# Patient Record
Sex: FEMALE | Race: BLACK OR AFRICAN AMERICAN | NOT HISPANIC OR LATINO | Employment: FULL TIME | URBAN - METROPOLITAN AREA
[De-identification: names, ages, dates, MRNs, and addresses within clinical notes are randomized per-mention and may not be internally consistent; named-entity substitution may affect disease eponyms.]

---

## 2017-01-03 ENCOUNTER — DOCUMENTATION ONLY (OUTPATIENT)
Dept: FAMILY MEDICINE | Facility: CLINIC | Age: 47
End: 2017-01-03

## 2017-01-03 ENCOUNTER — OFFICE VISIT (OUTPATIENT)
Dept: FAMILY MEDICINE | Facility: CLINIC | Age: 47
End: 2017-01-03
Payer: COMMERCIAL

## 2017-01-03 ENCOUNTER — HOSPITAL ENCOUNTER (OUTPATIENT)
Dept: RADIOLOGY | Facility: CLINIC | Age: 47
Discharge: HOME | End: 2017-01-03
Attending: FAMILY MEDICINE
Payer: COMMERCIAL

## 2017-01-03 VITALS
HEART RATE: 92 BPM | BODY MASS INDEX: 36.23 KG/M2 | OXYGEN SATURATION: 99 % | WEIGHT: 196.88 LBS | HEIGHT: 62 IN | TEMPERATURE: 98 F | SYSTOLIC BLOOD PRESSURE: 108 MMHG | DIASTOLIC BLOOD PRESSURE: 68 MMHG

## 2017-01-03 DIAGNOSIS — J30.1 SEASONAL ALLERGIC RHINITIS DUE TO POLLEN: ICD-10-CM

## 2017-01-03 DIAGNOSIS — Z13.220 SCREENING CHOLESTEROL LEVEL: ICD-10-CM

## 2017-01-03 DIAGNOSIS — Z83.3 FAMILY HISTORY OF DIABETES MELLITUS IN FATHER: ICD-10-CM

## 2017-01-03 DIAGNOSIS — E66.9 OBESITY (BMI 35.0-39.9 WITHOUT COMORBIDITY): ICD-10-CM

## 2017-01-03 DIAGNOSIS — Z12.39 SCREENING FOR BREAST CANCER: ICD-10-CM

## 2017-01-03 DIAGNOSIS — R07.9 CHEST PAIN, UNSPECIFIED TYPE: Primary | ICD-10-CM

## 2017-01-03 DIAGNOSIS — R61 NIGHT SWEATS: ICD-10-CM

## 2017-01-03 DIAGNOSIS — L73.1 INGROWN HAIR: ICD-10-CM

## 2017-01-03 LAB

## 2017-01-03 PROCEDURE — 77067 SCR MAMMO BI INCL CAD: CPT | Mod: TC

## 2017-01-03 PROCEDURE — 1159F MED LIST DOCD IN RCRD: CPT | Mod: S$GLB,,, | Performed by: PHYSICIAN ASSISTANT

## 2017-01-03 PROCEDURE — 99999 PR PBB SHADOW E&M-NEW PATIENT-LVL III: CPT | Mod: PBBFAC,,, | Performed by: PHYSICIAN ASSISTANT

## 2017-01-03 PROCEDURE — 93005 ELECTROCARDIOGRAM TRACING: CPT | Mod: S$GLB,,, | Performed by: FAMILY MEDICINE

## 2017-01-03 PROCEDURE — 77067 SCR MAMMO BI INCL CAD: CPT | Mod: 26,,, | Performed by: RADIOLOGY

## 2017-01-03 PROCEDURE — 99204 OFFICE O/P NEW MOD 45 MIN: CPT | Mod: S$GLB,,, | Performed by: PHYSICIAN ASSISTANT

## 2017-01-03 PROCEDURE — 81003 URINALYSIS AUTO W/O SCOPE: CPT

## 2017-01-03 PROCEDURE — 93010 ELECTROCARDIOGRAM REPORT: CPT | Mod: S$GLB,,, | Performed by: INTERNAL MEDICINE

## 2017-01-03 RX ORDER — SULFAMETHOXAZOLE AND TRIMETHOPRIM 800; 160 MG/1; MG/1
1 TABLET ORAL 2 TIMES DAILY
Qty: 20 TABLET | Refills: 0 | Status: SHIPPED | OUTPATIENT
Start: 2017-01-03 | End: 2017-01-13

## 2017-01-03 RX ORDER — MUPIROCIN 20 MG/G
OINTMENT TOPICAL 3 TIMES DAILY
Qty: 22 G | Refills: 0 | Status: SHIPPED | OUTPATIENT
Start: 2017-01-03 | End: 2017-01-13

## 2017-01-03 RX ORDER — FLUTICASONE PROPIONATE 50 MCG
2 SPRAY, SUSPENSION (ML) NASAL DAILY
Qty: 16 G | Refills: 2 | Status: SHIPPED | OUTPATIENT
Start: 2017-01-03

## 2017-01-03 NOTE — MR AVS SNAPSHOT
[facility]  [address]  [place] [address]  Phone: [phone]  Fax: [phone]                  Megan Brooks   1/3/2017 9:20 AM   Office Visit    Description:  Female : 1970   Provider:  PAUL Sparks   Department:  [place] [facility]           Reason for Visit     hot flashes, eye problem, pain           Diagnoses this Visit        Comments    Chest pain, unspecified type    -  Primary     Night sweats         Seasonal allergic rhinitis due to pollen         Family history of diabetes mellitus in father         Screening for breast cancer         Screening cholesterol level         Ingrown hair         Obesity (BMI 35.0-39.9 without comorbidity)                To Do List           Future Appointments        Provider Department Dept Phone    1/3/2017 10:15 AM [facility] MAMMO1 [facility]- Mammography [phone]    1/3/2017 11:45 AM LAB, [place] [facility] - Lab [phone]      Goals (5 Years of Data)     None      Follow-Up and Disposition     Return for labs today; needs apt to establish care.    Follow-up and Disposition History       These Medications        Disp Refills Start End    fluticasone (FLONASE) 50 mcg/actuation nasal spray 16 g 2 1/3/2017     2 sprays by Each Nare route once daily. - Each Nare    Pharmacy: [facility] [address] Ph #: [phone]       sulfamethoxazole-trimethoprim 800-160mg (BACTRIM DS) 800-160 mg Tab 20 tablet 0 1/3/2017 2017    Take 1 tablet by mouth 2 (two) times daily. - Oral    Pharmacy: [facility] [address] [facility] Ph #: [phone]       mupirocin (BACTROBAN) 2 % ointment 22 g 0 1/3/2017 2017    Apply topically 3 (three) times daily. - Topical (Top)    Pharmacy: [facility] [address] [facility] Ph #: [phone]        "  [facility]     [facility] -  Assistance  Registered nurses in the [facility] provide clinical advisement, health education, appointment booking, and other advisory services.  Call for this free service at [phone].             Medications           Message regarding Medications     Verify the changes and/or additions to your medication regime listed below are the same as discussed with your clinician today.  If any of these changes or additions are incorrect, please notify your healthcare provider.        START taking these NEW medications        Refills    fluticasone (FLONASE) 50 mcg/actuation nasal spray 2    Si sprays by Each Nare route once daily.    Class: Normal    Route: Each Nare    sulfamethoxazole-trimethoprim 800-160mg (BACTRIM DS) 800-160 mg Tab 0    Sig: Take 1 tablet by mouth 2 (two) times daily.    Class: Normal    Route: Oral    mupirocin (BACTROBAN) 2 % ointment 0    Sig: Apply topically 3 (three) times daily.    Class: Normal    Route: Topical (Top)           Verify that the below list of medications is an accurate representation of the medications you are currently taking.  If none reported, the list may be blank. If incorrect, please contact your healthcare provider. Carry this list with you in case of emergency.           Current Medications     fluticasone (FLONASE) 50 mcg/actuation nasal spray 2 sprays by Each Nare route once daily.    mupirocin (BACTROBAN) 2 % ointment Apply topically 3 (three) times daily.    sulfamethoxazole-trimethoprim 800-160mg (BACTRIM DS) 800-160 mg Tab Take 1 tablet by mouth 2 (two) times daily.           Clinical Reference Information           Vital Signs - Last Recorded  Most recent update: 1/3/2017  9:12 AM by Pily Benites    BP Pulse Temp Ht Wt SpO2    108/68 (BP Location: Right arm, Patient Position: Sitting, BP Method: Automatic) 92 98.2 °F (36.8 °C) (Oral) 5' 2" (1.575 m) 89.3 kg (196 lb 13.9 oz) 99%    BMI "                36.01 kg/m2          Blood Pressure          Most Recent Value    BP  108/68      Allergies as of 1/3/2017     No Known Allergies      Immunizations Administered on Date of Encounter - 1/3/2017     None      Orders Placed During Today's Visit      Normal Orders This Visit    IN OFFICE EKG 12-LEAD (to Dawn)     Urinalysis     Future Labs/Procedures Expected by Expires    CBC auto differential  1/3/2017 3/4/2018    Comprehensive metabolic panel  1/3/2017 3/4/2018    Hemoglobin A1c  1/3/2017 3/4/2018    Lipid panel  1/3/2017 3/4/2018    Mammo Digital Screening Bilat with CAD  1/3/2017 3/3/2018    TSH  1/3/2017 3/4/2018

## 2017-01-03 NOTE — PROGRESS NOTES
Pre-Visit Chart Review  For Appointment Scheduled on 01/03/17    Health Maintenance Due   Topic Date Due    Lipid Panel  1970    TETANUS VACCINE  12/19/1989    Pap Smear  12/19/1992    Mammogram  12/19/2011    Influenza Vaccine  08/01/2016

## 2017-01-05 DIAGNOSIS — R74.8 ALKALINE PHOSPHATASE ELEVATION: Primary | ICD-10-CM

## 2017-01-05 DIAGNOSIS — D64.9 ANEMIA, UNSPECIFIED TYPE: ICD-10-CM

## 2017-07-07 ENCOUNTER — HOSPITAL ENCOUNTER (EMERGENCY)
Facility: HOSPITAL | Age: 47
Discharge: HOME | End: 2017-07-07
Attending: EMERGENCY MEDICINE

## 2017-07-07 VITALS
SYSTOLIC BLOOD PRESSURE: 136 MMHG | DIASTOLIC BLOOD PRESSURE: 89 MMHG | RESPIRATION RATE: 20 BRPM | TEMPERATURE: 99 F | BODY MASS INDEX: 34.04 KG/M2 | HEIGHT: 62 IN | WEIGHT: 185 LBS | HEART RATE: 92 BPM | OXYGEN SATURATION: 100 %

## 2017-07-07 DIAGNOSIS — M25.50 ARTHRALGIA, UNSPECIFIED JOINT: Primary | ICD-10-CM

## 2017-07-07 PROCEDURE — 99283 EMERGENCY DEPT VISIT LOW MDM: CPT

## 2017-07-08 NOTE — ED NOTES
Pt presents with complaints of pain in joints in hands, feet and arms. Pt says she has had this for several months now and worse in the morning time. Pt not being treated at this time. No noted edema. Pt alert and oriented with neuro intact.

## 2017-07-08 NOTE — ED PROVIDER NOTES
Encounter Date: 7/7/2017       History     Chief Complaint   Patient presents with    Joint Pain     swelling hands / feet  / knees      Patient is a 46 year old female with complaint of joint pain for months. She denied PMH. She states she has pain and swelling to bilateral hands and feet. She states it is intermittent and worsened over the last week. She denied injury. She states she has been taking over the counter medications with no improvements. She denied redness of warmth to the area.       The history is provided by the patient.     Review of patient's allergies indicates:  No Known Allergies  History reviewed. No pertinent past medical history.  Past Surgical History:   Procedure Laterality Date    HYSTERECTOMY       Family History   Problem Relation Age of Onset    Hypertension Mother     Stroke Mother     Diabetes Mother     Coronary artery disease Mother     Heart attack Mother     Hypertension Father     Hyperlipidemia Father     Cancer Father      lung cancer     Social History   Substance Use Topics    Smoking status: Never Smoker    Smokeless tobacco: Never Used    Alcohol use Yes      Comment: holidays     Review of Systems   Constitutional: Negative for chills and fever.   HENT: Negative for congestion and sore throat.    Respiratory: Negative for cough and shortness of breath.    Cardiovascular: Negative for chest pain.   Gastrointestinal: Negative for abdominal pain, diarrhea, nausea and vomiting.   Genitourinary: Negative for dysuria.   Musculoskeletal: Positive for arthralgias and joint swelling. Negative for back pain.   Skin: Negative for rash.   Neurological: Negative for weakness.   Hematological: Does not bruise/bleed easily.       Physical Exam     Initial Vitals [07/07/17 1811]   BP Pulse Resp Temp SpO2   136/89 92 20 98.6 °F (37 °C) 100 %      MAP       104.67         Physical Exam    Nursing note and vitals reviewed.  Constitutional: She appears well-developed and  well-nourished. No distress.   HENT:   Head: Normocephalic and atraumatic.   Right Ear: External ear normal.   Left Ear: External ear normal.   Nose: Nose normal.   Eyes: Conjunctivae are normal. Pupils are equal, round, and reactive to light. Right eye exhibits no discharge. Left eye exhibits no discharge.   Neck: Normal range of motion. Neck supple.   Cardiovascular: Normal rate, regular rhythm and normal heart sounds. Exam reveals no gallop and no friction rub.    No murmur heard.  Pulmonary/Chest: Breath sounds normal. She has no wheezes. She has no rhonchi. She has no rales.   Abdominal: Soft. Bowel sounds are normal. There is no tenderness. There is no guarding.   Musculoskeletal: Normal range of motion.   Tenderness to bilateral hands and feet. There is no tenderness to ankle or wrists. There is no erythema or warmth. She has full ROM. Sensation intact. Cap refill < 3 seconds.    Neurological: She is alert.   Skin: Skin is warm and dry.         ED Course   Procedures  Labs Reviewed - No data to display          Medical Decision Making:   History:   Old Medical Records: I decided to obtain old medical records.       APC / Resident Notes:   This is an emergent evaluation of a 46 year old female with complaint of joint pain to bilateral hands and feet. She denied erythema or warmth. She is neurovascularly intact. She has full ROM. No sign of septic joint. symptoms consistent with arthritis. She has been instructed to continue NSAID use and follow up with rheumatology. Discussed results with patient. Return precautions given. Patient is to follow up with their primary care provider. Case was discussed with Dr. Kline who has evaluated the patient and is in agreement with the plan of care. All questions answered.                 ED Course     Clinical Impression:   The encounter diagnosis was Arthralgia, unspecified joint.                           Olena Leija PA-C  07/07/17 [id number]

## 2020-11-27 ENCOUNTER — OFFICE VISIT (OUTPATIENT)
Dept: RHEUMATOLOGY | Facility: CLINIC | Age: 50
End: 2020-11-27
Payer: MEDICARE

## 2020-11-27 VITALS
HEART RATE: 109 BPM | SYSTOLIC BLOOD PRESSURE: 120 MMHG | BODY MASS INDEX: 35.78 KG/M2 | WEIGHT: 194.44 LBS | TEMPERATURE: 98 F | DIASTOLIC BLOOD PRESSURE: 62 MMHG | HEIGHT: 62 IN

## 2020-11-27 DIAGNOSIS — M35.1 MCTD (MIXED CONNECTIVE TISSUE DISEASE): Primary | ICD-10-CM

## 2020-11-27 DIAGNOSIS — M19.90 OSTEOARTHRITIS, UNSPECIFIED OSTEOARTHRITIS TYPE, UNSPECIFIED SITE: ICD-10-CM

## 2020-11-27 DIAGNOSIS — Z79.899 ENCOUNTER FOR LONG-TERM (CURRENT) USE OF OTHER MEDICATIONS: ICD-10-CM

## 2020-11-27 DIAGNOSIS — M34.9 SCLERODERMA, DIFFUSE: ICD-10-CM

## 2020-11-27 DIAGNOSIS — M89.9 DISORDER OF BONE, UNSPECIFIED: ICD-10-CM

## 2020-11-27 DIAGNOSIS — M35.00 SJOGREN'S SYNDROME, WITH UNSPECIFIED ORGAN INVOLVEMENT: ICD-10-CM

## 2020-11-27 DIAGNOSIS — J84.9 ILD (INTERSTITIAL LUNG DISEASE): ICD-10-CM

## 2020-11-27 DIAGNOSIS — Z71.89 COUNSELING AND COORDINATION OF CARE: ICD-10-CM

## 2020-11-27 PROCEDURE — 99204 OFFICE O/P NEW MOD 45 MIN: CPT | Mod: S$PBB,,, | Performed by: INTERNAL MEDICINE

## 2020-11-27 PROCEDURE — 99999 PR PBB SHADOW E&M-NEW PATIENT-LVL IV: CPT | Mod: PBBFAC,,, | Performed by: INTERNAL MEDICINE

## 2020-11-27 PROCEDURE — 99204 OFFICE O/P NEW MOD 45 MIN: CPT | Mod: PBBFAC,PN | Performed by: INTERNAL MEDICINE

## 2020-11-27 PROCEDURE — 99999 PR PBB SHADOW E&M-NEW PATIENT-LVL IV: ICD-10-PCS | Mod: PBBFAC,,, | Performed by: INTERNAL MEDICINE

## 2020-11-27 PROCEDURE — 99204 PR OFFICE/OUTPT VISIT, NEW, LEVL IV, 45-59 MIN: ICD-10-PCS | Mod: S$PBB,,, | Performed by: INTERNAL MEDICINE

## 2020-11-27 RX ORDER — NYSTATIN 100000 U/G
OINTMENT TOPICAL
COMMUNITY
Start: 2020-11-10

## 2020-11-27 RX ORDER — GABAPENTIN 300 MG/1
600 CAPSULE ORAL 2 TIMES DAILY
Qty: 120 CAPSULE | Refills: 3 | Status: SHIPPED | OUTPATIENT
Start: 2020-11-27 | End: 2021-03-01 | Stop reason: SDUPTHER

## 2020-11-27 RX ORDER — CYCLOSPORINE 0.5 MG/ML
EMULSION OPHTHALMIC
COMMUNITY
Start: 2020-09-03

## 2020-11-27 RX ORDER — FUROSEMIDE 20 MG/1
TABLET ORAL
COMMUNITY
Start: 2020-10-31

## 2020-11-27 RX ORDER — SILDENAFIL CITRATE 20 MG/1
TABLET ORAL
COMMUNITY
Start: 2020-09-11

## 2020-11-27 RX ORDER — MIRTAZAPINE 30 MG/1
TABLET, FILM COATED ORAL
COMMUNITY
Start: 2020-09-04

## 2020-11-27 RX ORDER — HYDROXYCHLOROQUINE SULFATE 200 MG/1
TABLET, FILM COATED ORAL
COMMUNITY
Start: 2020-10-12 | End: 2023-10-11

## 2020-11-27 RX ORDER — ERYTHROMYCIN BASE 250 MG
CAPSULE,DELAYED RELEASE (ENTERIC COATED) ORAL
COMMUNITY
Start: 2020-11-20

## 2020-11-27 RX ORDER — PANTOPRAZOLE SODIUM 40 MG/1
TABLET, DELAYED RELEASE ORAL
COMMUNITY
Start: 2020-10-02

## 2020-11-27 RX ORDER — SUCRALFATE 1 G/10ML
SUSPENSION ORAL
COMMUNITY
Start: 2020-10-21

## 2020-11-27 RX ORDER — POTASSIUM CHLORIDE 750 MG/1
TABLET, EXTENDED RELEASE ORAL
COMMUNITY
Start: 2020-10-19

## 2020-11-27 RX ORDER — CELECOXIB 100 MG/1
100 CAPSULE ORAL 2 TIMES DAILY
Qty: 60 CAPSULE | Refills: 3 | Status: SHIPPED | OUTPATIENT
Start: 2020-11-27 | End: 2021-03-01 | Stop reason: SDUPTHER

## 2020-11-27 RX ORDER — MYCOPHENOLATE MOFETIL 500 MG/1
TABLET ORAL
COMMUNITY
Start: 2020-09-03

## 2020-11-27 RX ORDER — NIFEDIPINE 60 MG/1
TABLET, EXTENDED RELEASE ORAL
COMMUNITY
Start: 2020-11-25

## 2020-11-27 RX ORDER — OMEPRAZOLE 40 MG/1
CAPSULE, DELAYED RELEASE ORAL
COMMUNITY
Start: 2020-11-07

## 2020-11-27 RX ORDER — ESTRADIOL 0.1 MG/G
CREAM VAGINAL
COMMUNITY
Start: 2020-11-09

## 2020-11-27 RX ORDER — GABAPENTIN 300 MG/1
CAPSULE ORAL
COMMUNITY
Start: 2020-10-12 | End: 2020-11-27

## 2020-11-27 NOTE — PROGRESS NOTES
RHEUMATOLOGY OUTPATIENT CLINIC NOTE    11/27/2020    Attending Rheumatologist: Herrera Gonsales  Primary Care Provider: Kayley Morales MD (Inactive)   Physician Requesting Consultation: Kayley Morales MD  [address] [place] [address]  Chief Complaint/Reason For Consultation:  Pain      Subjective:       HPI  Megan Brooks is a 49 y.o. Black or  female with medical history noted below who presents to establish care for MCTD.     Patient use to follow with Dr. Fitch. Last seen in 8/2019.  +RF, ASHIA 1:620, +SSA, +SMRNP, +Centromere, +Chromatin, +RNP   Seems as her Raynaud's started in 2017, gets diagnosed with SSc and started on MMF 10/2017.   Started RTX 10/2018, last infusion 9/2020.   Follows with Dr. Hester at [facility].     Patient reports since Dr. Fitch left [facility], she has not seen a Rheumatologist. She reports following closely at [facility], where she get most of her Care: Pulm, GI, EYE. They are managing her immunosuppressive therapies.     She reports now that for the last couple of months she has been dealing with joint pain in her left shoulder, left elbow, left hip and knee. Had Hip Xray showing DJD. She notes the pain is all the time but it is worse at night as she tries to sleep on that side and cannot fall asleep. She reports minimal morning stiffness. And difficulties with daily tasks. Tried gabapentin for relief with no benefit. She endorses her breathing is stable as long as she uses the infusion. She notes her hands develops ulcers and become tender at times. Endorses alopecia, oral sores, sicca. Reports recent rash under her left breast which he PCP treated as fungal. It resolved. Her skin tightening has not progressed. Follows with GI for GERD.   No Photosensitivity, Pleuritis/serositis, Easy Bruising, LAD, Miscarriages/Blood clots.      Review of Systems   Constitutional: Positive for fatigue. Negative for appetite change, chills, fever and unexpected weight change.    HENT: Positive for mouth sores. Negative for nasal congestion, ear discharge, ear pain, hearing loss, nosebleeds, sneezing, sore throat, tinnitus and trouble swallowing.    Eyes: Negative for photophobia, pain, discharge, redness, itching and visual disturbance.   Respiratory: Positive for cough and shortness of breath. Negative for chest tightness and wheezing.    Cardiovascular: Negative for chest pain, palpitations and leg swelling.   Gastrointestinal: Positive for reflux. Negative for abdominal distention, abdominal pain, blood in stool, constipation, diarrhea, nausea and vomiting.   Endocrine: Negative for cold intolerance, heat intolerance, polydipsia, polyphagia and polyuria.   Genitourinary: Negative for difficulty urinating, dyspareunia, dysuria, flank pain, frequency, genital sores, hematuria, menstrual problem, pelvic pain, urgency, vaginal bleeding, vaginal discharge, vaginal pain and vaginal dryness.   Musculoskeletal: Positive for arthralgias, back pain, leg pain and myalgias. Negative for gait problem, joint swelling, neck pain, neck stiffness and joint deformity.   Integumentary:  Negative for pallor and rash.   Neurological: Negative for dizziness, seizures, weakness, light-headedness, numbness and headaches.   Hematological: Negative for adenopathy. Bruises/bleeds easily.   Psychiatric/Behavioral: Positive for sleep disturbance. Negative for confusion and decreased concentration. The patient is not nervous/anxious.    All other systems reviewed and are negative.       Chronic comorbid conditions affecting medical decision making today:  No past medical history on file.  Past Surgical History:   Procedure Laterality Date    HYSTERECTOMY       Family History   Problem Relation Age of Onset    Hypertension Mother     Stroke Mother     Diabetes Mother     Coronary artery disease Mother     Heart attack Mother     Hypertension Father     Hyperlipidemia Father     Cancer Father         lung  cancer     Social History     Substance and Sexual Activity   Alcohol Use Yes    Comment: holidays     Social History     Tobacco Use   Smoking Status Never Smoker   Smokeless Tobacco Never Used     Social History     Substance and Sexual Activity   Drug Use Not on file       Current Outpatient Medications:     erythromycin 250 mg EC capsule, , Disp: , Rfl:     estradioL (ESTRACE) 0.01 % (0.1 mg/gram) vaginal cream, , Disp: , Rfl:     fluticasone (FLONASE) 50 mcg/actuation nasal spray, 2 sprays by Each Nare route once daily., Disp: 16 g, Rfl: 2    furosemide (LASIX) 20 MG tablet, , Disp: , Rfl:     hydrOXYchloroQUINE (PLAQUENIL) 200 mg tablet, , Disp: , Rfl:     mirtazapine (REMERON) 30 MG tablet, , Disp: , Rfl:     mycophenolate (CELLCEPT) 500 mg Tab, , Disp: , Rfl:     NIFEdipine (ADALAT CC) 60 MG TbSR, , Disp: , Rfl:     nystatin (MYCOSTATIN) ointment, , Disp: , Rfl:     omeprazole (PRILOSEC) 40 MG capsule, , Disp: , Rfl:     pantoprazole (PROTONIX) 40 MG tablet, , Disp: , Rfl:     potassium chloride (KLOR-CON) 10 MEQ TbSR, , Disp: , Rfl:     RESTASIS 0.05 % ophthalmic emulsion, , Disp: , Rfl:     sildenafil (REVATIO) 20 mg Tab, , Disp: , Rfl:     sucralfate (CARAFATE) 100 mg/mL suspension, , Disp: , Rfl:     celecoxib (CELEBREX) 100 MG capsule, Take 1 capsule (100 mg total) by mouth 2 (two) times daily., Disp: 60 capsule, Rfl: 3    gabapentin (NEURONTIN) 300 MG capsule, Take 2 capsules (600 mg total) by mouth 2 (two) times daily., Disp: 120 capsule, Rfl: 3     Objective:         Vitals:    11/27/20 0825   BP: 120/62   Pulse: 109   Temp: 98.3 °F (36.8 °C)     Physical Exam   Constitutional: She is oriented to person, place, and time and well-developed, well-nourished, and in no distress.   HENT:   Head: Normocephalic and atraumatic.   Right Ear: External ear normal.   Left Ear: External ear normal.   Nose: Nose normal.   Mouth/Throat: Oropharynx is clear and moist.   Eyes: Conjunctivae and EOM  are normal. Pupils are equal, round, and reactive to light.   Neck: Normal range of motion. Neck supple.   Cardiovascular: Normal rate, regular rhythm and intact distal pulses.    Pulmonary/Chest: Effort normal and breath sounds normal.   Abdominal: Soft. Bowel sounds are normal.       Right Side Rheumatological Exam     Examination finds the shoulder, elbow, wrist, knee, 1st PIP, 1st MCP, 2nd PIP, 2nd MCP, 3rd PIP, 3rd MCP, 4th PIP, 4th MCP, 5th PIP and 5th MCP normal.    Left Side Rheumatological Exam     Examination finds the shoulder, elbow, wrist, knee, 1st PIP, 1st MCP, 2nd PIP, 2nd MCP, 3rd PIP, 3rd MCP, 4th PIP, 4th MCP, 5th PIP and 5th MCP normal.    Shoulder Exam   Tenderness Location: subacromion and biceps tendon    Crepitus: positive    Elbow/Wrist Exam   Tenderness Location: lateral epicondyle and medial epicondyle      Neurological: She is alert and oriented to person, place, and time.   Skin: No rash noted. No erythema.     Puffy fingers, osteolysis of left 2nd digit, no open ulcers, dry skin on her back    Psychiatric: Mood and affect normal.       Reviewed old and all outside pertinent medical records available.    All lab results personally reviewed and interpreted by me.  Lab Results   Component Value Date    WBC 6.41 01/03/2017    HGB 11.4 (L) 01/03/2017    HCT 36.2 (L) 01/03/2017    MCV 78 (L) 01/03/2017    MCH 24.4 (L) 01/03/2017    MCHC 31.5 (L) 01/03/2017    RDW 16.0 (H) 01/03/2017     01/03/2017    MPV 10.2 01/03/2017       Lab Results   Component Value Date     01/03/2017    K 3.9 01/03/2017     01/03/2017    CO2 25 01/03/2017    GLU 84 01/03/2017    BUN 9 01/03/2017    CALCIUM 9.4 01/03/2017    PROT 8.7 (H) 01/03/2017    ALBUMIN 3.4 (L) 01/03/2017    BILITOT 0.3 01/03/2017    AST 34 01/03/2017    ALKPHOS 177 (H) 01/03/2017    ALT 29 01/03/2017       Lab Results   Component Value Date    COLORU Yellow 01/03/2017    APPEARANCEUA Hazy (A) 01/03/2017    SPECGRAV 1.015  01/03/2017    PHUR 5.0 01/03/2017    PROTEINUA Negative 01/03/2017    KETONESU Negative 01/03/2017    LEUKOCYTESUR Negative 01/03/2017    NITRITE Negative 01/03/2017    UROBILINOGEN Negative 01/03/2017       No results found for: CRP    No results found for: SEDRATE, ERYTHROCYTES    No results found for: ASHIA, RF, SEDRATE    No components found for: 25OHVITDTOT, 21EFRZMH4, 51FJOUBZ8, METHODNOTE    No results found for: URICACID    No components found for: TSPOTTB      Imaging:  All imaging reviewed and independently interpreted by me.         ASSESSMENT / PLAN:     Megan Brooks is a 49 y.o. Black or  female with:      1. Mixed Connective Tissue Disease  - Patient with features of Sjogren's and Scleroderma, she has sicca, sclerodactyly, ILD, GERD, Skin tightening, oral sores, alopecia, Raynaud's   - she is currently under the care of both Pulmonary and GI at [facility]   - she is under immunosuppressive therapy: RTX, MMF, HCQ  - I agree with current management and will leave the choice of IS therapy to them   - I will update blood work here today  - CBC Auto Differential; Future  - Comprehensive Metabolic Panel; Future  - C4 Complement; Future  - C3 Complement; Future  - Protein/Creatinine Ratio, Urine; Future  - C-Reactive Protein; Future  - TSH; Future  - Vitamin D; Future  - Sedimentation rate; Future  - Anti-DNA Ab, Double-Stranded; Standing    2. Osteoarthritis, unspecified osteoarthritis type, unspecified site  - I believe the source of her pain to be mechanical and not inflammatory in nature   - I have discussed disease diagnosis and management   - will add NSAIDs and increased Gabapentin   - celecoxib (CELEBREX) 100 MG capsule; Take 1 capsule (100 mg total) by mouth 2 (two) times daily.  Dispense: 60 capsule; Refill: 3  - gabapentin (NEURONTIN) 300 MG capsule; Take 2 capsules (600 mg total) by mouth 2 (two) times daily.  Dispense: 120 capsule; Refill: 3  - Ambulatory referral/consult to  Physical/Occupational Therapy; Future  - Reassurance and exercise     3. Chronic NSAID use  - no history of GI bleed or coronary artery disease.  - previous labs without features of CKD.  - clinical significance side effect of long-term NSAID use discussed in detail.  - recommended for alternative therapies for pain management.    4. DMARD Toxicity Screening  - no live vaccines  - yearly EYE exams  - labs q3 months     5. Other specified counseling  - over 10 minutes spent regarding below topics:  - Immunization counseling done.  - Weight loss counseling done.  - Nutrition and exercise counseling.  - Limitation of alcohol consumption.  - Regular exercise:  Aerobic and resistance.  - Medication counseling provided.    6. Obesity  - would benefit from decreasing at least 10% of body weight.  - recommended goal of losing 1 lb per week.  - consider nutritionist evaluation.  - would consider screening for MAIKEL per PMD.    Follow up in about 3 months (around 2/27/2021).    Method of contact with patient concerns: [facility]    Disclaimer:  This note is prepared using voice recognition software and as such is likely to have errors and has not been proof read. Please contact me for questions.     Time spent: 45 minutes in face to face discussion concerning diagnosis, prognosis, review of lab and test results, benefits of treatment as well as management of disease, counseling of patient and coordination of care between various health care providers.  Greater than half the time spent was used for coordination of care and counseling of patient.    Herrera Gonsales M.D.  Rheumatology Department   [facility]

## 2020-11-30 ENCOUNTER — PATIENT MESSAGE (OUTPATIENT)
Dept: RHEUMATOLOGY | Facility: CLINIC | Age: 50
End: 2020-11-30

## 2020-11-30 ENCOUNTER — TELEPHONE (OUTPATIENT)
Dept: RHEUMATOLOGY | Facility: CLINIC | Age: 50
End: 2020-11-30

## 2020-11-30 DIAGNOSIS — E55.9 VITAMIN D DEFICIENCY: Primary | ICD-10-CM

## 2020-11-30 RX ORDER — ERGOCALCIFEROL 1.25 MG/1
50000 CAPSULE ORAL
Qty: 12 CAPSULE | Refills: 0 | Status: SHIPPED | OUTPATIENT
Start: 2020-11-30

## 2020-12-21 ENCOUNTER — CLINICAL SUPPORT (OUTPATIENT)
Dept: REHABILITATION | Facility: HOSPITAL | Age: 50
End: 2020-12-21
Attending: INTERNAL MEDICINE
Payer: MEDICARE

## 2020-12-21 DIAGNOSIS — M25.512 CHRONIC LEFT SHOULDER PAIN: ICD-10-CM

## 2020-12-21 DIAGNOSIS — M19.90 OSTEOARTHRITIS, UNSPECIFIED OSTEOARTHRITIS TYPE, UNSPECIFIED SITE: ICD-10-CM

## 2020-12-21 DIAGNOSIS — R29.3 POSTURE IMBALANCE: ICD-10-CM

## 2020-12-21 DIAGNOSIS — G89.29 CHRONIC LEFT SHOULDER PAIN: ICD-10-CM

## 2020-12-21 PROCEDURE — 97110 THERAPEUTIC EXERCISES: CPT | Mod: PN

## 2020-12-21 PROCEDURE — 97161 PT EVAL LOW COMPLEX 20 MIN: CPT | Mod: PN

## 2020-12-21 NOTE — PLAN OF CARE
[facility]  Physical Therapy Initial Evaluation    Name: Megan Brooks  Clinic Number: [id number]    Therapy Diagnosis:   Encounter Diagnoses   Name Primary?    Osteoarthritis, unspecified osteoarthritis type, unspecified site     Posture imbalance     Chronic left shoulder pain      Physician: Herrera Gonsales MD    Physician Orders: PT Eval and Treat   Medical Diagnosis from Referral:   M19.90 (ICD-10-CM) - Osteoarthritis, unspecified osteoarthritis type, unspecified site    Evaluation Date: 12/21/2020  Plan of Care Expiration: 3/21/21    Authorization Period Expiration: 11/27/21  Visit # / Visits authorized: 1/1      Time In: 1215  Time Out: 1300    Total Billable Time: 45 minutes    Precautions: Standard    Subjective   Date of onset: one year    History of current condition:   Megan  is a 50 year old right handed female presenting with c/o whole body and joint pain. She c/o mostly of left sided pain and general weakness. Her primary concern is her right shoulder.  She has a diagnosis of RA.  She reports an insidious onset of let  a year ago. She states she may have OA in the hips. The patient denies a history of falls or use of an assistive device. She denies a balance problems.  Her goal is to decrease left upper and lower extremity pain and weakness and return to normal.      Medical History:   No past medical history on file.    Surgical History:   Megan Brooks  has a past surgical history that includes Hysterectomy.    Medications:   Megan has a current medication list which includes the following prescription(s): celecoxib, ergocalciferol, erythromycin, estradiol, fluticasone propionate, furosemide, gabapentin, hydroxychloroquine, mirtazapine, mycophenolate, nifedipine, nystatin, omeprazole, pantoprazole, potassium chloride, restasis, sildenafil, and sucralfate.    Allergies:   Review of patient's allergies indicates:  No Known Allergies     Imaging:  Not on record.      Prior Therapy: none  Social History:  Lives with [facility], [address]  Occupation: not working  Prior Level of Function: independent  Current Level of Function: independent    Pain:  Current 3/10, worst 10/10, best 3/10   Location: left shoulder, left arm/leg     Aggravating Factors: she doesn't know, lying in bed.  random  Easing Factors: rubbing it, pain pil    Pts goals:   Her goal is to decrease left upper and lower extremity pain and weakness and return to normal.       Objective     Observation: pt stands with rounded shoulders, forward head posture.   Gait: no sign of antalgia or abnormalities with gait.   Palpation: mild tenderness anterolateral deltoid inferior to acromion    Range of Motion/Strength:     AROM: Bilateral UE/LE: Grossly WFL left shoulder mild limitation to 145 deg flexion/abd, ER to 60 limited by pain  MMT:  Right UE/LE: 4/5   Left UE/LE: 4-/5  Abdominal Strength: 3+/5    Bed Mobility:Independent  Transfers: Independent    Special Tests:   -positive painful arc  -positive impingement tests x 5      CMS Impairment/Limitation/Restriction for FOTO Arthropathies Survey  Status Limitation G-Code CMS Severity Modifier  Intake 53% 47% Current Status CK - At least 40 percent but less than 60 percent  Predicted 60% 40% Goal Status+ CK - At least 40 percent but less than 60 percent    TREATMENT     Treatment Time In: 1245  Treatment Time Out: 1300    Total Treatment time separate from Evaluation: 15 minutes    Megan received therapeutic exercises to develop strength, endurance, ROM, flexibility, posture and core stabilization for 10 minutes including:   -anglin stretch 1 min x 2  -scapular retraction 2 x 10  -supine wand flexion x 15    Megan received the following manual therapy techniques:  were applied to the: left shoudlr for 5 minutes, including:  -GH oscillation      Education provided:   - HEP compliance    Written Home Exercises Provided: yes.    Exercises were reviewed and Megan was  able to demonstrate them prior to the end of the session.  Megan demonstrated good  understanding of the education provided.     See EMR under Patient Instructions for exercises provided 12/21/2020.    Assessment     Pt presents with signs and symptoms consistent with referring diagnosis. Evaluation has determined a decrease in functional status and subjective and objective deficits that can be addressed by physical therapy intervention. Pt demonstrates pain limiting functional activities. Decreased flexibility and strength limiting normal movement patterns. Decreased segmental motion. Decreased postural strength and awareness. Positive special testing. Decreased participation in functional and recreational activities. Subjective and objective measures are addressed by goals in the plan of care.  Patient/family are involved in the development of these goals. Patient/family are educated about current injury and treatment.       Plan of care was dicussed with patient. Pt will benefit from skilled outpatient Physical Therapy to address the deficits stated above and in the chart below, provide pt/family education, and to maximize pt's level of independence. Pt's spiritual, cultural and educational needs considered and patient is agreeable to the plan of care and goals as stated below:     Pt prognosis is Good.  Anticipated Barriers for therapy: none    Medical Necessity is demonstrated by the following  History  Co-morbidities and personal factors that may impact the plan of care Co-morbidities:   RA  Sjogens Disease    Personal Factors:   no deficits     low   Examination  Body Structures and Functions, activity limitations and participation restrictions that may impact the plan of care Body Regions:   lower extremities  upper extremities    Body Systems:    gross symmetry  ROM  strength    Participation Restrictions:   Housework, cooking    Activity limitations:   Learning and applying knowledge  no  deficits    General Tasks and Commands  no deficits    Communication  no deficits    Mobility  lifting and carrying objects  walking    Self care  no deficits    Domestic Life  shopping  cooking  doing house work (cleaning house, washing dishes, laundry)    Interactions/Relationships  no deficits    Life Areas  no deficits    Community and Social Life  community life         low   Clinical Presentation stable and uncomplicated low   Decision Making/ Complexity Score: low     Goals:    Short Term Goals (4 Weeks):     1.Pt to increase strength by a 1/2 grade of muscles test to allow for improvement in functional activities such as performing chores.  2.Pt to improve range of motion by 25% to allow for improved functional mobility to allow for improvement in IADLs.   3.Pt to report compliance with HEP and demonstrate proper exercise technique to PT to show competence with self management of condition.  4.Decrease pain by 25% during functional activities.    Long Term Goals (12 Weeks):     1. Increase ROM to allow improved joint biomechanics during functional activities.   2.Increase trunk, upper and lower extremity strength to within normal limits during functional activities.   3. Independent with home exercise program.   4. Full return to functional activities with manageable complaints.  5. Patient to demonstrate improved posture and body mechanics.  6. Decrease pain by 75% during functional activities.    Plan     Plan of care Certification: 12/21/2020 to 3/21/21.    Recommended Treatment Plan: 2 times per week for 12 weeks with treatments to consist of:  Neuromuscular and postural re-education,  training, therapeutic exercise, therapeutic activities,balance training, gait training, manual therapy, soft tissue mobilization, ROM exercises, Cardiovascular,  Postural stabilization, manual traction, spinal mobilization, moist heat, cryotherapy, electrical stimulation, ultrasound, home exercise education  and planning.    Vladimir Hernández, PT

## 2020-12-29 ENCOUNTER — CLINICAL SUPPORT (OUTPATIENT)
Dept: REHABILITATION | Facility: HOSPITAL | Age: 50
End: 2020-12-29
Attending: INTERNAL MEDICINE
Payer: MEDICARE

## 2020-12-29 DIAGNOSIS — R29.3 POSTURE IMBALANCE: ICD-10-CM

## 2020-12-29 DIAGNOSIS — G89.29 CHRONIC LEFT SHOULDER PAIN: ICD-10-CM

## 2020-12-29 DIAGNOSIS — M25.512 CHRONIC LEFT SHOULDER PAIN: ICD-10-CM

## 2020-12-29 PROCEDURE — 97110 THERAPEUTIC EXERCISES: CPT | Mod: PN

## 2020-12-29 NOTE — PROGRESS NOTES
Physical Therapy Daily Treatment Note     Name: Megan Brooks  Clinic Number: [id number]    Therapy Diagnosis:   Encounter Diagnoses   Name Primary?    Posture imbalance     Chronic left shoulder pain      Physician: Herrera Gonsales MD    Visit Date: 12/29/2020    Physician Orders: PT Eval and Treat   Medical Diagnosis from Referral:   M19.90 (ICD-10-CM) - Osteoarthritis, unspecified osteoarthritis type, unspecified site     Evaluation Date: 12/21/2020  Plan of Care Expiration: 3/21/21     Authorization Period Expiration: 11/27/21  Visit # / Visits authorized: 1/1       Time In: 0915  Time Out: 1000    Total Billable Time: 45 minutes    Precautions: Standard, RA, Raynauds's    Subjective     Pt reports: no significant changes since initial visit.  She was compliant with home exercise program.  Response to previous treatment: good  Functional change: none    Pain: 6/10  Location: left UE      Objective     Megan received therapeutic exercises to develop strength, endurance, ROM, flexibility, posture and core stabilization for 40 minutes including:     -otis scaption x 5 min  -bilateral shoulder extension/rows RTB   -pec stretch 1 min x 2  -scapular retraction 2 x 10  -supine wand flexion x 15 on 1/2 roll  -isometric flexion 5 sec x 15  -thoracic extension with towel roll x 20  -left LE quad set 2 x 10  -L SLR 2 x 10  -left hip abd, sidelying 2 x 10     Megan received the following manual therapy techniques:  were applied to the: left shoudlr for 5 minutes, including:  -GH oscillation      Education provided:   - HEP compliance      Written Home Exercises Provided: Patient instructed to cont prior HEP.  Exercises were reviewed and Megan was able to demonstrate them prior to the end of the session.  Megan demonstrated good  understanding of the education provided.     See EMR under Patient Instructions for exercises provided 12/29/2020.    Assessment     No c/o increased discomfort with prescribed activities.   Pt requires moderate cueing with postural awareness with prescribed therex.  Good response to exercise progression.   Megan is progressing well towards her goals.   Pt prognosis is Good.     Pt will continue to benefit from skilled outpatient physical therapy to address the deficits listed in the problem list box on initial evaluation, provide pt/family education and to maximize pt's level of independence in the home and community environment.     Pt's spiritual, cultural and educational needs considered and pt agreeable to plan of care and goals.     Anticipated barriers to physical therapy: none    Short Term Goals (4 Weeks):     1.Pt to increase strength by a 1/2 grade of muscles test to allow for improvement in functional activities such as performing chores.  2.Pt to improve range of motion by 25% to allow for improved functional mobility to allow for improvement in IADLs.   3.Pt to report compliance with HEP and demonstrate proper exercise technique to PT to show competence with self management of condition.  4.Decrease pain by 25% during functional activities.    Long Term Goals (12 Weeks):     1. Increase ROM to allow improved joint biomechanics during functional activities.   2.Increase trunk and lower extremity strength to within normal limits during functional activities.   3. Independent with home exercise program.   4. Full return to functional activities with manageable complaints.  5. Patient to demonstrate improved posture and body mechanics.  6. Decrease pain by 75% during functional activities.         Plan       Recommended Treatment Plan: 2-3 times per week for 12 weeks with treatments to consist of:  Neuromuscular and postural re-education,  training, therapeutic exercise, therapeutic activities,balance training, gait training, manual therapy, soft tissue mobilization, ROM exercises, Cardiovascular,  Postural stabilization, manual traction, spinal mobilization, moist heat,  cryotherapy, electrical stimulation, ultrasound, home exercise education and planning.    Continue with established Plan of Care towards PT goals.     Vladimir Hernández, PT

## 2021-01-06 ENCOUNTER — CLINICAL SUPPORT (OUTPATIENT)
Dept: REHABILITATION | Facility: HOSPITAL | Age: 51
End: 2021-01-06
Attending: INTERNAL MEDICINE
Payer: MEDICARE

## 2021-01-06 DIAGNOSIS — G89.29 CHRONIC LEFT SHOULDER PAIN: ICD-10-CM

## 2021-01-06 DIAGNOSIS — R29.3 POSTURE IMBALANCE: ICD-10-CM

## 2021-01-06 DIAGNOSIS — M25.512 CHRONIC LEFT SHOULDER PAIN: ICD-10-CM

## 2021-01-06 PROCEDURE — 97110 THERAPEUTIC EXERCISES: CPT | Mod: PN

## 2021-01-28 ENCOUNTER — CLINICAL SUPPORT (OUTPATIENT)
Dept: REHABILITATION | Facility: HOSPITAL | Age: 51
End: 2021-01-28
Attending: INTERNAL MEDICINE
Payer: MEDICARE

## 2021-01-28 DIAGNOSIS — R29.3 POSTURE IMBALANCE: ICD-10-CM

## 2021-01-28 DIAGNOSIS — M25.512 CHRONIC LEFT SHOULDER PAIN: ICD-10-CM

## 2021-01-28 DIAGNOSIS — G89.29 CHRONIC LEFT SHOULDER PAIN: ICD-10-CM

## 2021-01-28 PROCEDURE — 97110 THERAPEUTIC EXERCISES: CPT | Mod: PN,CQ

## 2021-03-01 ENCOUNTER — OFFICE VISIT (OUTPATIENT)
Dept: RHEUMATOLOGY | Facility: CLINIC | Age: 51
End: 2021-03-01
Payer: MEDICARE

## 2021-03-01 VITALS
WEIGHT: 188.69 LBS | TEMPERATURE: 99 F | HEART RATE: 83 BPM | RESPIRATION RATE: 18 BRPM | HEIGHT: 62 IN | SYSTOLIC BLOOD PRESSURE: 122 MMHG | BODY MASS INDEX: 34.72 KG/M2 | DIASTOLIC BLOOD PRESSURE: 83 MMHG

## 2021-03-01 DIAGNOSIS — Z79.1 NSAID LONG-TERM USE: ICD-10-CM

## 2021-03-01 DIAGNOSIS — M15.9 PRIMARY OSTEOARTHRITIS INVOLVING MULTIPLE JOINTS: ICD-10-CM

## 2021-03-01 DIAGNOSIS — Z79.899 ENCOUNTER FOR LONG-TERM (CURRENT) USE OF OTHER MEDICATIONS: ICD-10-CM

## 2021-03-01 DIAGNOSIS — M35.1 MCTD (MIXED CONNECTIVE TISSUE DISEASE): Primary | ICD-10-CM

## 2021-03-01 DIAGNOSIS — M79.7 FIBROMYALGIA: ICD-10-CM

## 2021-03-01 DIAGNOSIS — Z71.89 COUNSELING AND COORDINATION OF CARE: ICD-10-CM

## 2021-03-01 DIAGNOSIS — E66.9 CLASS 1 OBESITY WITH BODY MASS INDEX (BMI) OF 34.0 TO 34.9 IN ADULT, UNSPECIFIED OBESITY TYPE, UNSPECIFIED WHETHER SERIOUS COMORBIDITY PRESENT: ICD-10-CM

## 2021-03-01 PROCEDURE — 99999 PR PBB SHADOW E&M-EST. PATIENT-LVL IV: CPT | Mod: PBBFAC,,, | Performed by: INTERNAL MEDICINE

## 2021-03-01 PROCEDURE — 99214 PR OFFICE/OUTPT VISIT, EST, LEVL IV, 30-39 MIN: ICD-10-PCS | Mod: S$PBB,,, | Performed by: INTERNAL MEDICINE

## 2021-03-01 PROCEDURE — 99214 OFFICE O/P EST MOD 30 MIN: CPT | Mod: PBBFAC,PN | Performed by: INTERNAL MEDICINE

## 2021-03-01 PROCEDURE — 99214 OFFICE O/P EST MOD 30 MIN: CPT | Mod: S$PBB,,, | Performed by: INTERNAL MEDICINE

## 2021-03-01 PROCEDURE — 99999 PR PBB SHADOW E&M-EST. PATIENT-LVL IV: ICD-10-PCS | Mod: PBBFAC,,, | Performed by: INTERNAL MEDICINE

## 2021-03-01 RX ORDER — CELECOXIB 100 MG/1
100 CAPSULE ORAL 2 TIMES DAILY
Qty: 60 CAPSULE | Refills: 3 | Status: SHIPPED | OUTPATIENT
Start: 2021-03-01 | End: 2021-06-08 | Stop reason: SDUPTHER

## 2021-03-01 RX ORDER — CYCLOBENZAPRINE HCL 5 MG
5 TABLET ORAL 3 TIMES DAILY PRN
Qty: 60 TABLET | Refills: 3 | Status: SHIPPED | OUTPATIENT
Start: 2021-03-01 | End: 2021-06-08 | Stop reason: SDUPTHER

## 2021-03-01 RX ORDER — DULOXETIN HYDROCHLORIDE 30 MG/1
30 CAPSULE, DELAYED RELEASE ORAL DAILY
Qty: 30 CAPSULE | Refills: 11 | Status: SHIPPED | OUTPATIENT
Start: 2021-03-01 | End: 2021-06-08 | Stop reason: SDUPTHER

## 2021-03-01 RX ORDER — GABAPENTIN 300 MG/1
600 CAPSULE ORAL 2 TIMES DAILY
Qty: 120 CAPSULE | Refills: 3 | Status: SHIPPED | OUTPATIENT
Start: 2021-03-01 | End: 2021-06-08 | Stop reason: SDUPTHER

## 2021-04-16 ENCOUNTER — PATIENT MESSAGE (OUTPATIENT)
Dept: RESEARCH | Facility: HOSPITAL | Age: 51
End: 2021-04-16

## 2021-06-08 ENCOUNTER — OFFICE VISIT (OUTPATIENT)
Dept: RHEUMATOLOGY | Facility: CLINIC | Age: 51
End: 2021-06-08
Payer: MEDICARE

## 2021-06-08 VITALS
HEIGHT: 62 IN | WEIGHT: 198.19 LBS | SYSTOLIC BLOOD PRESSURE: 146 MMHG | DIASTOLIC BLOOD PRESSURE: 85 MMHG | RESPIRATION RATE: 18 BRPM | BODY MASS INDEX: 36.47 KG/M2 | HEART RATE: 91 BPM | TEMPERATURE: 99 F

## 2021-06-08 DIAGNOSIS — Z79.899 ENCOUNTER FOR LONG-TERM (CURRENT) USE OF OTHER MEDICATIONS: ICD-10-CM

## 2021-06-08 DIAGNOSIS — Z71.89 COUNSELING AND COORDINATION OF CARE: ICD-10-CM

## 2021-06-08 DIAGNOSIS — E66.9 CLASS 2 OBESITY WITH BODY MASS INDEX (BMI) OF 36.0 TO 36.9 IN ADULT, UNSPECIFIED OBESITY TYPE, UNSPECIFIED WHETHER SERIOUS COMORBIDITY PRESENT: ICD-10-CM

## 2021-06-08 DIAGNOSIS — Z79.1 NSAID LONG-TERM USE: ICD-10-CM

## 2021-06-08 DIAGNOSIS — M79.7 FIBROMYALGIA: ICD-10-CM

## 2021-06-08 DIAGNOSIS — M15.9 PRIMARY OSTEOARTHRITIS INVOLVING MULTIPLE JOINTS: ICD-10-CM

## 2021-06-08 DIAGNOSIS — M35.1 MCTD (MIXED CONNECTIVE TISSUE DISEASE): Primary | ICD-10-CM

## 2021-06-08 PROCEDURE — 99999 PR PBB SHADOW E&M-EST. PATIENT-LVL III: ICD-10-PCS | Mod: PBBFAC,,, | Performed by: INTERNAL MEDICINE

## 2021-06-08 PROCEDURE — 99213 OFFICE O/P EST LOW 20 MIN: CPT | Mod: PBBFAC,PN | Performed by: INTERNAL MEDICINE

## 2021-06-08 PROCEDURE — 99214 PR OFFICE/OUTPT VISIT, EST, LEVL IV, 30-39 MIN: ICD-10-PCS | Mod: S$PBB,,, | Performed by: INTERNAL MEDICINE

## 2021-06-08 PROCEDURE — 99999 PR PBB SHADOW E&M-EST. PATIENT-LVL III: CPT | Mod: PBBFAC,,, | Performed by: INTERNAL MEDICINE

## 2021-06-08 PROCEDURE — 99214 OFFICE O/P EST MOD 30 MIN: CPT | Mod: S$PBB,,, | Performed by: INTERNAL MEDICINE

## 2021-06-08 RX ORDER — CYCLOBENZAPRINE HCL 5 MG
5 TABLET ORAL 3 TIMES DAILY PRN
Qty: 60 TABLET | Refills: 3 | Status: SHIPPED | OUTPATIENT
Start: 2021-06-08 | End: 2021-11-17 | Stop reason: SDUPTHER

## 2021-06-08 RX ORDER — CELECOXIB 100 MG/1
100 CAPSULE ORAL 2 TIMES DAILY
Qty: 60 CAPSULE | Refills: 3 | Status: SHIPPED | OUTPATIENT
Start: 2021-06-08 | End: 2021-11-17 | Stop reason: SDUPTHER

## 2021-06-08 RX ORDER — GABAPENTIN 300 MG/1
600 CAPSULE ORAL 2 TIMES DAILY
Qty: 120 CAPSULE | Refills: 3 | Status: SHIPPED | OUTPATIENT
Start: 2021-06-08 | End: 2021-11-17 | Stop reason: SDUPTHER

## 2021-06-08 RX ORDER — DULOXETIN HYDROCHLORIDE 30 MG/1
30 CAPSULE, DELAYED RELEASE ORAL DAILY
Qty: 30 CAPSULE | Refills: 11 | Status: SHIPPED | OUTPATIENT
Start: 2021-06-08 | End: 2021-11-17 | Stop reason: SDUPTHER

## 2021-11-17 ENCOUNTER — OFFICE VISIT (OUTPATIENT)
Dept: RHEUMATOLOGY | Facility: CLINIC | Age: 51
End: 2021-11-17
Payer: MEDICARE

## 2021-11-17 VITALS
TEMPERATURE: 99 F | HEART RATE: 102 BPM | DIASTOLIC BLOOD PRESSURE: 76 MMHG | RESPIRATION RATE: 18 BRPM | HEIGHT: 62 IN | WEIGHT: 200.81 LBS | BODY MASS INDEX: 36.95 KG/M2 | SYSTOLIC BLOOD PRESSURE: 141 MMHG

## 2021-11-17 DIAGNOSIS — I73.00 RAYNAUD'S DISEASE WITHOUT GANGRENE: ICD-10-CM

## 2021-11-17 DIAGNOSIS — Z79.899 ENCOUNTER FOR LONG-TERM (CURRENT) USE OF OTHER MEDICATIONS: ICD-10-CM

## 2021-11-17 DIAGNOSIS — M35.1 MCTD (MIXED CONNECTIVE TISSUE DISEASE): Primary | ICD-10-CM

## 2021-11-17 DIAGNOSIS — Z71.89 COUNSELING AND COORDINATION OF CARE: ICD-10-CM

## 2021-11-17 DIAGNOSIS — E66.9 CLASS 2 OBESITY WITH BODY MASS INDEX (BMI) OF 36.0 TO 36.9 IN ADULT, UNSPECIFIED OBESITY TYPE, UNSPECIFIED WHETHER SERIOUS COMORBIDITY PRESENT: ICD-10-CM

## 2021-11-17 DIAGNOSIS — M15.9 PRIMARY OSTEOARTHRITIS INVOLVING MULTIPLE JOINTS: ICD-10-CM

## 2021-11-17 DIAGNOSIS — Z79.1 NSAID LONG-TERM USE: ICD-10-CM

## 2021-11-17 DIAGNOSIS — M79.7 FIBROMYALGIA: ICD-10-CM

## 2021-11-17 PROCEDURE — 99999 PR PBB SHADOW E&M-EST. PATIENT-LVL III: CPT | Mod: PBBFAC,,, | Performed by: INTERNAL MEDICINE

## 2021-11-17 PROCEDURE — 99999 PR PBB SHADOW E&M-EST. PATIENT-LVL III: ICD-10-PCS | Mod: PBBFAC,,, | Performed by: INTERNAL MEDICINE

## 2021-11-17 PROCEDURE — 99213 OFFICE O/P EST LOW 20 MIN: CPT | Mod: PBBFAC,PN | Performed by: INTERNAL MEDICINE

## 2021-11-17 PROCEDURE — 99214 PR OFFICE/OUTPT VISIT, EST, LEVL IV, 30-39 MIN: ICD-10-PCS | Mod: S$PBB,,, | Performed by: INTERNAL MEDICINE

## 2021-11-17 PROCEDURE — 99214 OFFICE O/P EST MOD 30 MIN: CPT | Mod: S$PBB,,, | Performed by: INTERNAL MEDICINE

## 2021-11-17 RX ORDER — CELECOXIB 100 MG/1
100 CAPSULE ORAL 2 TIMES DAILY
Qty: 60 CAPSULE | Refills: 3 | Status: SHIPPED | OUTPATIENT
Start: 2021-11-17 | End: 2022-02-18 | Stop reason: SDUPTHER

## 2021-11-17 RX ORDER — NITROGLYCERIN 20 MG/G
0.5 OINTMENT TOPICAL 3 TIMES DAILY
Qty: 45 INCH | Refills: 11 | Status: SHIPPED | OUTPATIENT
Start: 2021-11-17 | End: 2023-10-11

## 2021-11-17 RX ORDER — CYCLOBENZAPRINE HCL 5 MG
5 TABLET ORAL 3 TIMES DAILY PRN
Qty: 60 TABLET | Refills: 3 | Status: SHIPPED | OUTPATIENT
Start: 2021-11-17 | End: 2022-02-18 | Stop reason: SDUPTHER

## 2021-11-17 RX ORDER — GABAPENTIN 300 MG/1
600 CAPSULE ORAL 2 TIMES DAILY
Qty: 120 CAPSULE | Refills: 3 | Status: SHIPPED | OUTPATIENT
Start: 2021-11-17 | End: 2022-02-18 | Stop reason: SDUPTHER

## 2021-11-17 RX ORDER — DULOXETIN HYDROCHLORIDE 30 MG/1
30 CAPSULE, DELAYED RELEASE ORAL DAILY
Qty: 30 CAPSULE | Refills: 11 | Status: SHIPPED | OUTPATIENT
Start: 2021-11-17 | End: 2022-02-18 | Stop reason: SDUPTHER

## 2022-02-18 ENCOUNTER — OFFICE VISIT (OUTPATIENT)
Dept: RHEUMATOLOGY | Facility: CLINIC | Age: 52
End: 2022-02-18
Payer: MEDICARE

## 2022-02-18 VITALS
DIASTOLIC BLOOD PRESSURE: 81 MMHG | WEIGHT: 204.81 LBS | HEIGHT: 62 IN | BODY MASS INDEX: 37.69 KG/M2 | HEART RATE: 106 BPM | SYSTOLIC BLOOD PRESSURE: 127 MMHG

## 2022-02-18 DIAGNOSIS — Z79.1 NSAID LONG-TERM USE: ICD-10-CM

## 2022-02-18 DIAGNOSIS — Z79.899 ENCOUNTER FOR LONG-TERM (CURRENT) USE OF OTHER MEDICATIONS: ICD-10-CM

## 2022-02-18 DIAGNOSIS — Z71.89 COUNSELING AND COORDINATION OF CARE: ICD-10-CM

## 2022-02-18 DIAGNOSIS — E66.9 CLASS 2 OBESITY WITH BODY MASS INDEX (BMI) OF 37.0 TO 37.9 IN ADULT, UNSPECIFIED OBESITY TYPE, UNSPECIFIED WHETHER SERIOUS COMORBIDITY PRESENT: ICD-10-CM

## 2022-02-18 DIAGNOSIS — M35.1 MCTD (MIXED CONNECTIVE TISSUE DISEASE): Primary | ICD-10-CM

## 2022-02-18 DIAGNOSIS — M79.7 FIBROMYALGIA: ICD-10-CM

## 2022-02-18 DIAGNOSIS — M15.9 PRIMARY OSTEOARTHRITIS INVOLVING MULTIPLE JOINTS: ICD-10-CM

## 2022-02-18 PROCEDURE — 99999 PR PBB SHADOW E&M-EST. PATIENT-LVL III: CPT | Mod: PBBFAC,,, | Performed by: INTERNAL MEDICINE

## 2022-02-18 PROCEDURE — 99214 PR OFFICE/OUTPT VISIT, EST, LEVL IV, 30-39 MIN: ICD-10-PCS | Mod: S$PBB,,, | Performed by: INTERNAL MEDICINE

## 2022-02-18 PROCEDURE — 99213 OFFICE O/P EST LOW 20 MIN: CPT | Mod: PBBFAC,PN | Performed by: INTERNAL MEDICINE

## 2022-02-18 PROCEDURE — 99214 OFFICE O/P EST MOD 30 MIN: CPT | Mod: S$PBB,,, | Performed by: INTERNAL MEDICINE

## 2022-02-18 PROCEDURE — 99999 PR PBB SHADOW E&M-EST. PATIENT-LVL III: ICD-10-PCS | Mod: PBBFAC,,, | Performed by: INTERNAL MEDICINE

## 2022-02-18 RX ORDER — CELECOXIB 100 MG/1
100 CAPSULE ORAL 2 TIMES DAILY
Qty: 60 CAPSULE | Refills: 6 | Status: SHIPPED | OUTPATIENT
Start: 2022-02-18 | End: 2023-10-11 | Stop reason: SDUPTHER

## 2022-02-18 RX ORDER — DULOXETIN HYDROCHLORIDE 30 MG/1
30 CAPSULE, DELAYED RELEASE ORAL DAILY
Qty: 30 CAPSULE | Refills: 11 | Status: SHIPPED | OUTPATIENT
Start: 2022-02-18 | End: 2023-02-18

## 2022-02-18 RX ORDER — CYCLOBENZAPRINE HCL 5 MG
5 TABLET ORAL 3 TIMES DAILY PRN
Qty: 60 TABLET | Refills: 6 | Status: SHIPPED | OUTPATIENT
Start: 2022-02-18 | End: 2023-01-19

## 2022-02-18 RX ORDER — GABAPENTIN 300 MG/1
600 CAPSULE ORAL 2 TIMES DAILY
Qty: 120 CAPSULE | Refills: 6 | Status: SHIPPED | OUTPATIENT
Start: 2022-02-18 | End: 2023-01-19

## 2022-02-18 NOTE — PROGRESS NOTES
RHEUMATOLOGY OUTPATIENT CLINIC NOTE    2/18/2022    Attending Rheumatologist: Herrera Gonsales  Primary Care Provider: Kayley Morales MD (Inactive)   Physician Requesting Consultation: No referring provider defined for this encounter.  Chief Complaint/Reason For Consultation:  No chief complaint on file.      Subjective:       HPI  Megan Brooks is a 51 y.o. Black or  female with medical history noted below who presents to establish care for MCTD.   Patient use to follow with Dr. Fitch. Last seen in 8/2019.  +RF, ASHIA 1:620, +SSA, +SMRNP, +Centromere, +Chromatin, +RNP   Seems as her Raynaud's started in 2017, gets diagnosed with SSc and started on MMF 10/2017.   Started RTX 10/2018, last infusion 9/2020.   Follows with Dr. Hester at [facility].   Patient reports since Dr. Fitch left [facility], she has not seen a Rheumatologist. She reports following closely at [facility], where she get most of her Care: Pulm, GI, EYE. They are managing her immunosuppressive therapies.   She reports now that for the last couple of months she has been dealing with joint pain in her left shoulder, left elbow, left hip and knee. Had Hip Xray showing DJD. She notes the pain is all the time but it is worse at night as she tries to sleep on that side and cannot fall asleep. She reports minimal morning stiffness. And difficulties with daily tasks. Tried gabapentin for relief with no benefit. She endorses her breathing is stable as long as she uses the infusion. She notes her hands develops ulcers and become tender at times. Endorses alopecia, oral sores, sicca. Reports recent rash under her left breast which he PCP treated as fungal. It resolved. Her skin tightening has not progressed. Follows with GI for GERD.   No Photosensitivity, Pleuritis/serositis, Easy Bruising, LAD, Miscarriages/Blood clots.    Today  Patient here for follow up.   Last visit care for MCTD continued, given Nitro paste for Raynaud's. She notes it provides  relief when used. Notes she is doing well, as no complaints today. Tolerating meds.       Review of Systems   Constitutional: Negative for appetite change, chills, fatigue, fever and unexpected weight change.   HENT: Negative for nasal congestion, ear discharge, ear pain, hearing loss, mouth sores, nosebleeds, sneezing, sore throat, tinnitus and trouble swallowing.    Eyes: Negative for photophobia, pain, discharge, redness, itching and visual disturbance.   Respiratory: Negative for cough, chest tightness, shortness of breath and wheezing.    Cardiovascular: Negative for chest pain, palpitations and leg swelling.   Gastrointestinal: Negative for abdominal distention, abdominal pain, blood in stool, constipation, diarrhea, nausea, vomiting and reflux.   Endocrine: Negative for cold intolerance, heat intolerance, polydipsia, polyphagia and polyuria.   Genitourinary: Negative for difficulty urinating, dyspareunia, dysuria, flank pain, frequency, genital sores, hematuria, menstrual problem, pelvic pain, urgency, vaginal bleeding, vaginal discharge, vaginal pain and vaginal dryness.   Musculoskeletal: Negative for arthralgias, back pain, gait problem, joint swelling, leg pain, myalgias, neck pain, neck stiffness and joint deformity.   Integumentary:  Negative for pallor and rash.   Neurological: Negative for dizziness, seizures, weakness, light-headedness, numbness and headaches.   Hematological: Negative for adenopathy. Does not bruise/bleed easily.   Psychiatric/Behavioral: Negative for confusion, decreased concentration and sleep disturbance. The patient is not nervous/anxious.    All other systems reviewed and are negative.       Chronic comorbid conditions affecting medical decision making today:  No past medical history on file.  Past Surgical History:   Procedure Laterality Date    HYSTERECTOMY       Family History   Problem Relation Age of Onset    Hypertension Mother     Stroke Mother     Diabetes Mother      Coronary artery disease Mother     Heart attack Mother     Hypertension Father     Hyperlipidemia Father     Cancer Father         lung cancer     Social History     Substance and Sexual Activity   Alcohol Use Yes    Comment: holidays     Social History     Tobacco Use   Smoking Status Never Smoker   Smokeless Tobacco Never Used     Social History     Substance and Sexual Activity   Drug Use Not on file       Current Outpatient Medications:     celecoxib (CELEBREX) 100 MG capsule, Take 1 capsule (100 mg total) by mouth 2 (two) times daily., Disp: 60 capsule, Rfl: 6    cyclobenzaprine (FLEXERIL) 5 MG tablet, Take 1 tablet (5 mg total) by mouth 3 (three) times daily as needed for Muscle spasms., Disp: 60 tablet, Rfl: 6    DULoxetine (CYMBALTA) 30 MG capsule, Take 1 capsule (30 mg total) by mouth once daily., Disp: 30 capsule, Rfl: 11    ergocalciferol (ERGOCALCIFEROL) 50,000 unit Cap, Take 1 capsule (50,000 Units total) by mouth every 7 days., Disp: 12 capsule, Rfl: 0    erythromycin 250 mg EC capsule, , Disp: , Rfl:     estradioL (ESTRACE) 0.01 % (0.1 mg/gram) vaginal cream, , Disp: , Rfl:     fluticasone (FLONASE) 50 mcg/actuation nasal spray, 2 sprays by Each Nare route once daily., Disp: 16 g, Rfl: 2    furosemide (LASIX) 20 MG tablet, , Disp: , Rfl:     gabapentin (NEURONTIN) 300 MG capsule, Take 2 capsules (600 mg total) by mouth 2 (two) times daily., Disp: 120 capsule, Rfl: 6    hydrOXYchloroQUINE (PLAQUENIL) 200 mg tablet, , Disp: , Rfl:     mirtazapine (REMERON) 30 MG tablet, , Disp: , Rfl:     mycophenolate (CELLCEPT) 500 mg Tab, , Disp: , Rfl:     NIFEdipine (ADALAT CC) 60 MG TbSR, , Disp: , Rfl:     nitroGLYCERIN 2% TD oint (NITRO-BID) 2 % ointment, Apply 0.5 inches topically 3 (three) times daily., Disp: 45 inch, Rfl: 11    nystatin (MYCOSTATIN) ointment, , Disp: , Rfl:     omeprazole (PRILOSEC) 40 MG capsule, , Disp: , Rfl:     pantoprazole (PROTONIX) 40 MG tablet, , Disp: , Rfl:      potassium chloride (KLOR-CON) 10 MEQ TbSR, , Disp: , Rfl:     RESTASIS 0.05 % ophthalmic emulsion, , Disp: , Rfl:     sildenafil (REVATIO) 20 mg Tab, , Disp: , Rfl:     sucralfate (CARAFATE) 100 mg/mL suspension, , Disp: , Rfl:      Objective:         Vitals:    02/18/22 0955   BP: 127/81   Pulse: 106     Physical Exam   Constitutional: She is oriented to person, place, and time.   HENT:   Head: Normocephalic and atraumatic.   Right Ear: External ear normal.   Left Ear: External ear normal.   Nose: Nose normal.   Mouth/Throat: Oropharynx is clear and moist.   Eyes: Pupils are equal, round, and reactive to light. Conjunctivae are normal.   Cardiovascular: Normal rate and regular rhythm.   Pulmonary/Chest: Effort normal and breath sounds normal.   Abdominal: Soft. Bowel sounds are normal.   Musculoskeletal:      Right shoulder: Normal.      Left shoulder: Normal.      Right elbow: Normal.      Left elbow: Normal.      Right wrist: Normal.      Left wrist: Normal.      Cervical back: Normal range of motion and neck supple.      Right knee: Normal.      Left knee: Normal.      Comments: Tender Points:  No   Yes  (x )    ()   Low cervical anterior aspect    (x )    ()   Costochondral Junction   (x )    ()   Lateral Epicondyle  (x )    ()   Suboccipital   (x )    ()   Trapezius   (x )    ()   Supraspinatus   (x )    ()   Gluteal   (x )    ()   Greater trochanter  (x )    ()   Knee       Neurological: She is alert and oriented to person, place, and time.   Skin: No rash noted. No erythema.   Puffy fingers, osteolysis of left 2nd digit, no open ulcers, dry skin on her back    Psychiatric: Mood and affect normal.       Right Side Rheumatological Exam     Examination finds the shoulder, elbow, wrist, knee, 1st PIP, 1st MCP, 2nd PIP, 2nd MCP, 3rd PIP, 3rd MCP, 4th PIP, 4th MCP, 5th PIP and 5th MCP normal.    Left Side Rheumatological Exam     Examination finds the shoulder, elbow, wrist, knee, 1st PIP, 1st MCP, 2nd  PIP, 2nd MCP, 3rd PIP, 3rd MCP, 4th PIP, 4th MCP, 5th PIP and 5th MCP normal.          Reviewed old and all outside pertinent medical records available.    All lab results personally reviewed and interpreted by me.  Lab Results   Component Value Date    WBC 3.57 (L) 11/27/2020    HGB 10.4 (L) 11/27/2020    HCT 35.4 (L) 11/27/2020    MCV 79 (L) 11/27/2020    MCH 23.1 (L) 11/27/2020    MCHC 29.4 (L) 11/27/2020    RDW 14.6 (H) 11/27/2020     11/27/2020    MPV 10.3 11/27/2020       Lab Results   Component Value Date     11/27/2020    K 3.1 (L) 11/27/2020     11/27/2020    CO2 27 11/27/2020     11/27/2020    BUN 6 11/27/2020    CALCIUM 9.2 11/27/2020    PROT 8.5 (H) 11/27/2020    ALBUMIN 4.0 11/27/2020    BILITOT 0.2 11/27/2020    AST 15 11/27/2020    ALKPHOS 167 (H) 11/27/2020    ALT 9 (L) 11/27/2020       Lab Results   Component Value Date    COLORU Yellow 01/03/2017    APPEARANCEUA Hazy (A) 01/03/2017    SPECGRAV 1.015 01/03/2017    PHUR 5.0 01/03/2017    PROTEINUA Negative 01/03/2017    KETONESU Negative 01/03/2017    LEUKOCYTESUR Negative 01/03/2017    NITRITE Negative 01/03/2017    UROBILINOGEN Negative 01/03/2017       Lab Results   Component Value Date    CRP 6.5 11/27/2020       Lab Results   Component Value Date    SEDRATE 57 (H) 11/27/2020       Lab Results   Component Value Date    SEDRATE 57 (H) 11/27/2020       No components found for: 25OHVITDTOT, 82EOOPJG1, 77TQUWUY9, METHODNOTE    No results found for: URICACID    No components found for: TSPOTTB      Imaging:  All imaging reviewed and independently interpreted by me.         ASSESSMENT / PLAN:     Megan Brooks is a 51 y.o. Black or  female with:      1. Mixed Connective Tissue Disease  - Patient with features of Sjogren's and Scleroderma, she has sicca, sclerodactyly, ILD, GERD, Skin tightening, oral sores, alopecia, Raynaud's   - she is currently under the care of both Pulmonary and GI at [facility]   - she is under  immunosuppressive therapy: RTX, MMF, HCQ  - I agree with current management and will leave the choice of IS therapy to them   - s/p RTX 11/1/21  - doing well  - recent labs and ECHO reviewd   - reassurance     2. Fibromyalgia  - stable   - continue Cymbalta, Flexeril, Gabapentin and NSAIDs    - sleep hygiene reinforced  - reassurance and exercise     3. Osteoarthritis, unspecified osteoarthritis type, unspecified site  - wt loss   - continue NSAIDs   - Reassurance and exercise     4. Chronic NSAID use  - no history of GI bleed or coronary artery disease.  - previous labs without features of CKD.  - clinical significance side effect of long-term NSAID use discussed in detail.  - recommended for alternative therapies for pain management.    5. DMARD Toxicity Screening  - no live vaccines  - yearly EYE exams  - labs q3 months     6. Other specified counseling  - over 10 minutes spent regarding below topics:  - Immunization counseling done.  - Weight loss counseling done.  - Nutrition and exercise counseling.  - Limitation of alcohol consumption.  - Regular exercise:  Aerobic and resistance.  - Medication counseling provided.    7. Obesity  - would benefit from decreasing at least 10% of body weight.  - recommended goal of losing 1 lb per week.  - consider nutritionist evaluation.  - would consider screening for MAIKEL per PMD.    Follow up in about 6 months (around 8/18/2022).    Method of contact with patient concerns: [facility]    Disclaimer:  This note is prepared using voice recognition software and as such is likely to have errors and has not been proof read. Please contact me for questions.     Time spent: 30 minutes in face to face discussion concerning diagnosis, prognosis, review of lab and test results, benefits of treatment as well as management of disease, counseling of patient and coordination of care between various health care providers.  Greater than half the time spent was used for  coordination of care and counseling of patient.    Herrera Gonsales M.D.  Rheumatology Department   [facility]

## 2022-02-22 NOTE — DISCHARGE INSTRUCTIONS
No additional comment Take ibuprofen as needed.  See your primary care provider in one week.  Call and make an appointment with one of the rheumatologist listed above.  For worsening symptoms, chest pain, shortness of breath, increased abdominal pain, high grade fever, stroke or stroke like symptoms, immediately go to the nearest Emergency Room or call 911 as soon as possible.

## 2022-06-06 ENCOUNTER — OFFICE VISIT (OUTPATIENT)
Dept: RHEUMATOLOGY | Facility: CLINIC | Age: 52
End: 2022-06-06
Payer: MEDICARE

## 2022-06-06 ENCOUNTER — PATIENT MESSAGE (OUTPATIENT)
Dept: RHEUMATOLOGY | Facility: CLINIC | Age: 52
End: 2022-06-06

## 2022-06-06 DIAGNOSIS — M35.1 MCTD (MIXED CONNECTIVE TISSUE DISEASE): Primary | ICD-10-CM

## 2022-06-06 DIAGNOSIS — M15.9 PRIMARY OSTEOARTHRITIS INVOLVING MULTIPLE JOINTS: ICD-10-CM

## 2022-06-06 DIAGNOSIS — Z79.1 NSAID LONG-TERM USE: ICD-10-CM

## 2022-06-06 DIAGNOSIS — Z79.899 ENCOUNTER FOR LONG-TERM (CURRENT) USE OF OTHER MEDICATIONS: ICD-10-CM

## 2022-06-06 DIAGNOSIS — Z71.89 COUNSELING AND COORDINATION OF CARE: ICD-10-CM

## 2022-06-06 DIAGNOSIS — M79.7 FIBROMYALGIA: ICD-10-CM

## 2022-06-06 DIAGNOSIS — E66.9 OBESITY, UNSPECIFIED CLASSIFICATION, UNSPECIFIED OBESITY TYPE, UNSPECIFIED WHETHER SERIOUS COMORBIDITY PRESENT: ICD-10-CM

## 2022-06-06 PROCEDURE — 99214 PR OFFICE/OUTPT VISIT, EST, LEVL IV, 30-39 MIN: ICD-10-PCS | Mod: 95,,, | Performed by: INTERNAL MEDICINE

## 2022-06-06 PROCEDURE — 99214 OFFICE O/P EST MOD 30 MIN: CPT | Mod: 95,,, | Performed by: INTERNAL MEDICINE

## 2022-06-06 NOTE — PROGRESS NOTES
The patient location is: [place]  The chief complaint leading to consultation is: MCTD follow up   Visit type: Virtual visit with synchronous audio and video  Total time spent with patient: 15 minutes     Each patient to whom he or she provides medical services by telemedicine is:  (1) informed of the relationship between the physician and patient and the respective role of any other health care provider with respect to management of the patient; and (2) notified that he or she may decline to receive medical services by telemedicine and may withdraw from such care at any time.         RHEUMATOLOGY OUTPATIENT CLINIC NOTE    6/6/2022    Attending Rheumatologist: Herrera Gonsales  Primary Care Provider: ROSALIA CLAROS MD   Physician Requesting Consultation: No referring provider defined for this encounter.  Chief Complaint/Reason For Consultation:  No chief complaint on file.      Subjective:       MAHIN Brooks is a 51 y.o. Black or  female with medical history noted below who presents to establish care for MCTD.   Patient use to follow with Dr. Fitch. Last seen in 8/2019.  +RF, ASHIA 1:620, +SSA, +SMRNP, +Centromere, +Chromatin, +RNP   Seems as her Raynaud's started in 2017, gets diagnosed with SSc and started on MMF 10/2017.   Started RTX 10/2018, last infusion 9/2020.   Follows with Dr. Hester at [facility].   Patient reports since Dr. Fitch left [facility], she has not seen a Rheumatologist. She reports following closely at [facility], where she get most of her Care: Pulm, GI, EYE. They are managing her immunosuppressive therapies.   She reports now that for the last couple of months she has been dealing with joint pain in her left shoulder, left elbow, left hip and knee. Had Hip Xray showing DJD. She notes the pain is all the time but it is worse at night as she tries to sleep on that side and cannot fall asleep. She reports minimal morning stiffness. And difficulties with daily tasks. Tried gabapentin for relief  with no benefit. She endorses her breathing is stable as long as she uses the infusion. She notes her hands develops ulcers and become tender at times. Endorses alopecia, oral sores, sicca. Reports recent rash under her left breast which he PCP treated as fungal. It resolved. Her skin tightening has not progressed. Follows with GI for GERD.   No Photosensitivity, Pleuritis/serositis, Easy Bruising, LAD, Miscarriages/Blood clots.    Today  Patient here for follow up.   Last visit care continued for MTCD. She notes she is having ulcer of her left toe. Otherwise has been stable. Tolerating meds.       Review of Systems   Constitutional: Negative for appetite change, chills, fatigue, fever and unexpected weight change.   HENT: Negative for nasal congestion, ear discharge, ear pain, hearing loss, mouth sores, nosebleeds, sneezing, sore throat, tinnitus and trouble swallowing.    Eyes: Negative for photophobia, pain, discharge, redness, itching and visual disturbance.   Respiratory: Negative for cough, chest tightness, shortness of breath and wheezing.    Cardiovascular: Negative for chest pain, palpitations and leg swelling.   Gastrointestinal: Negative for abdominal distention, abdominal pain, blood in stool, constipation, diarrhea, nausea, vomiting and reflux.   Endocrine: Negative for cold intolerance, heat intolerance, polydipsia, polyphagia and polyuria.   Genitourinary: Negative for difficulty urinating, dyspareunia, dysuria, flank pain, frequency, genital sores, hematuria, menstrual problem, pelvic pain, urgency, vaginal bleeding, vaginal discharge, vaginal pain and vaginal dryness.   Musculoskeletal: Negative for arthralgias, back pain, gait problem, joint swelling, leg pain, myalgias, neck pain, neck stiffness and joint deformity.   Integumentary:  Negative for pallor and rash.   Neurological: Negative for dizziness, seizures, weakness, light-headedness, numbness and headaches.   Hematological: Negative for  adenopathy. Does not bruise/bleed easily.   Psychiatric/Behavioral: Negative for confusion, decreased concentration and sleep disturbance. The patient is not nervous/anxious.    All other systems reviewed and are negative.       Chronic comorbid conditions affecting medical decision making today:  No past medical history on file.  Past Surgical History:   Procedure Laterality Date    HYSTERECTOMY       Family History   Problem Relation Age of Onset    Hypertension Mother     Stroke Mother     Diabetes Mother     Coronary artery disease Mother     Heart attack Mother     Hypertension Father     Hyperlipidemia Father     Cancer Father         lung cancer     Social History     Substance and Sexual Activity   Alcohol Use Yes    Comment: holidays     Social History     Tobacco Use   Smoking Status Never Smoker   Smokeless Tobacco Never Used     Social History     Substance and Sexual Activity   Drug Use Not on file       Current Outpatient Medications:     celecoxib (CELEBREX) 100 MG capsule, Take 1 capsule (100 mg total) by mouth 2 (two) times daily., Disp: 60 capsule, Rfl: 6    cyclobenzaprine (FLEXERIL) 5 MG tablet, Take 1 tablet (5 mg total) by mouth 3 (three) times daily as needed for Muscle spasms., Disp: 60 tablet, Rfl: 6    DULoxetine (CYMBALTA) 30 MG capsule, Take 1 capsule (30 mg total) by mouth once daily., Disp: 30 capsule, Rfl: 11    ergocalciferol (ERGOCALCIFEROL) 50,000 unit Cap, Take 1 capsule (50,000 Units total) by mouth every 7 days., Disp: 12 capsule, Rfl: 0    erythromycin 250 mg EC capsule, , Disp: , Rfl:     estradioL (ESTRACE) 0.01 % (0.1 mg/gram) vaginal cream, , Disp: , Rfl:     fluticasone (FLONASE) 50 mcg/actuation nasal spray, 2 sprays by Each Nare route once daily., Disp: 16 g, Rfl: 2    furosemide (LASIX) 20 MG tablet, , Disp: , Rfl:     gabapentin (NEURONTIN) 300 MG capsule, Take 2 capsules (600 mg total) by mouth 2 (two) times daily., Disp: 120 capsule, Rfl: 6     hydrOXYchloroQUINE (PLAQUENIL) 200 mg tablet, , Disp: , Rfl:     mirtazapine (REMERON) 30 MG tablet, , Disp: , Rfl:     mycophenolate (CELLCEPT) 500 mg Tab, , Disp: , Rfl:     NIFEdipine (ADALAT CC) 60 MG TbSR, , Disp: , Rfl:     nitroGLYCERIN 2% TD oint (NITRO-BID) 2 % ointment, Apply 0.5 inches topically 3 (three) times daily., Disp: 45 inch, Rfl: 11    nystatin (MYCOSTATIN) ointment, , Disp: , Rfl:     omeprazole (PRILOSEC) 40 MG capsule, , Disp: , Rfl:     pantoprazole (PROTONIX) 40 MG tablet, , Disp: , Rfl:     potassium chloride (KLOR-CON) 10 MEQ TbSR, , Disp: , Rfl:     RESTASIS 0.05 % ophthalmic emulsion, , Disp: , Rfl:     sildenafil (REVATIO) 20 mg Tab, , Disp: , Rfl:     sucralfate (CARAFATE) 100 mg/mL suspension, , Disp: , Rfl:      Objective:         There were no vitals filed for this visit.  Physical Exam  Virtual Visit   Reviewed old and all outside pertinent medical records available.    All lab results personally reviewed and interpreted by me.  Lab Results   Component Value Date    WBC 3.57 (L) 11/27/2020    HGB 10.4 (L) 11/27/2020    HCT 35.4 (L) 11/27/2020    MCV 79 (L) 11/27/2020    MCH 23.1 (L) 11/27/2020    MCHC 29.4 (L) 11/27/2020    RDW 14.6 (H) 11/27/2020     11/27/2020    MPV 10.3 11/27/2020       Lab Results   Component Value Date     11/27/2020    K 3.1 (L) 11/27/2020     11/27/2020    CO2 27 11/27/2020     11/27/2020    BUN 6 11/27/2020    CALCIUM 9.2 11/27/2020    PROT 8.5 (H) 11/27/2020    ALBUMIN 4.0 11/27/2020    BILITOT 0.2 11/27/2020    AST 15 11/27/2020    ALKPHOS 167 (H) 11/27/2020    ALT 9 (L) 11/27/2020       Lab Results   Component Value Date    COLORU Yellow 01/03/2017    APPEARANCEUA Hazy (A) 01/03/2017    SPECGRAV 1.015 01/03/2017    PHUR 5.0 01/03/2017    PROTEINUA Negative 01/03/2017    KETONESU Negative 01/03/2017    LEUKOCYTESUR Negative 01/03/2017    NITRITE Negative 01/03/2017    UROBILINOGEN Negative 01/03/2017       Lab Results    Component Value Date    CRP 6.5 11/27/2020       Lab Results   Component Value Date    SEDRATE 57 (H) 11/27/2020       Lab Results   Component Value Date    SEDRATE 57 (H) 11/27/2020       No components found for: 25OHVITDTOT, 96HJMFSN0, 07MUFUWS9, METHODNOTE    No results found for: URICACID    No components found for: TSPOTTB      Imaging:  All imaging reviewed and independently interpreted by me.         ASSESSMENT / PLAN:     Megan Brooks is a 51 y.o. Black or  female with:      1. Mixed Connective Tissue Disease  - Patient with features of Sjogren's and Scleroderma, she has sicca, sclerodactyly, ILD, GERD, Skin tightening, oral sores, alopecia, Raynaud's   - she is currently under the care of both Pulmonary and GI at [facility]   - she is under immunosuppressive therapy: RTX (last infusion 5/22), MMF, HCQ  - I agree with current management and will leave the choice of IS therapy to them   -  She is c/o ulcer on her toe, cannot be seen clearly via virtual visit, advised to take picture and send it to me   - she is on CCB and PDE5i, advised to use Nitro paste, if at risk to lose finger or gangrene would need to go to ED  - reassurance     2. Fibromyalgia  - stable   - continue Cymbalta, Flexeril, Gabapentin and NSAIDs    - sleep hygiene reinforced  - reassurance and exercise     3. Osteoarthritis, unspecified osteoarthritis type, unspecified site  - wt loss   - continue NSAIDs   - Reassurance and exercise     4. Chronic NSAID use  - no history of GI bleed or coronary artery disease.  - previous labs without features of CKD.  - clinical significance side effect of long-term NSAID use discussed in detail.  - recommended for alternative therapies for pain management.    5. DMARD Toxicity Screening  - no live vaccines  - yearly EYE exams  - labs q3 months     6. Other specified counseling  - over 10 minutes spent regarding below topics:  - Immunization counseling done.  - Weight loss counseling done.  -  Nutrition and exercise counseling.  - Limitation of alcohol consumption.  - Regular exercise:  Aerobic and resistance.  - Medication counseling provided.    7. Obesity  - would benefit from decreasing at least 10% of body weight.  - recommended goal of losing 1 lb per week.  - consider nutritionist evaluation.  - would consider screening for MAIKEL per PMD.    Follow up in about 3 months (around 9/6/2022).    Method of contact with patient concerns: [facility]    Disclaimer:  This note is prepared using voice recognition software and as such is likely to have errors and has not been proof read. Please contact me for questions.     Time spent: 30 minutes in face to face discussion concerning diagnosis, prognosis, review of lab and test results, benefits of treatment as well as management of disease, counseling of patient and coordination of care between various health care providers.  Greater than half the time spent was used for coordination of care and counseling of patient.    Herrera Gonsales M.D.  Rheumatology Department   [facility]

## 2023-01-17 ENCOUNTER — TELEPHONE (OUTPATIENT)
Dept: RHEUMATOLOGY | Facility: CLINIC | Age: 53
End: 2023-01-17
Payer: MEDICARE

## 2023-01-17 NOTE — TELEPHONE ENCOUNTER
Called pt back and informed Dr. Gonsales is no longer at [place] location . Has permanently moved to [facility] . She first accept 1/19 first openig for in person than changed mind to virtual .

## 2023-01-17 NOTE — TELEPHONE ENCOUNTER
----- Message from Suma Tello sent at 1/17/2023 10:30 AM CST -----  Regarding: Sooner Appt  Contact: Patient  Type:  Sooner Apoointment Request    Caller is requesting a sooner appointment.  Caller declined first available appointment listed below.  Caller will not accept being placed on the waitlist and is requesting a message be sent to doctor.  Name of Caller:Patient   When is the first available appointment?no appts generated   Symptoms: follow up   Would the patient rather a call back or a response via [facility]? Call back   Best Call Back Number: [phone]  Additional Information: Patient was last seen 06/06/2022 and would like a follow up appt with physician at [facility] please assist

## 2023-01-19 ENCOUNTER — OFFICE VISIT (OUTPATIENT)
Dept: RHEUMATOLOGY | Facility: CLINIC | Age: 53
End: 2023-01-19
Payer: MEDICARE

## 2023-01-19 DIAGNOSIS — M79.7 FIBROMYALGIA: ICD-10-CM

## 2023-01-19 DIAGNOSIS — M15.9 PRIMARY OSTEOARTHRITIS INVOLVING MULTIPLE JOINTS: ICD-10-CM

## 2023-01-19 DIAGNOSIS — E66.9 OBESITY, UNSPECIFIED CLASSIFICATION, UNSPECIFIED OBESITY TYPE, UNSPECIFIED WHETHER SERIOUS COMORBIDITY PRESENT: ICD-10-CM

## 2023-01-19 DIAGNOSIS — Z79.1 NSAID LONG-TERM USE: ICD-10-CM

## 2023-01-19 DIAGNOSIS — Z79.899 IMMUNODEFICIENCY DUE TO TREATMENT WITH IMMUNOSUPPRESSIVE MEDICATION: ICD-10-CM

## 2023-01-19 DIAGNOSIS — Z71.89 COUNSELING AND COORDINATION OF CARE: ICD-10-CM

## 2023-01-19 DIAGNOSIS — M35.1 MCTD (MIXED CONNECTIVE TISSUE DISEASE): Primary | ICD-10-CM

## 2023-01-19 DIAGNOSIS — D84.821 IMMUNODEFICIENCY DUE TO TREATMENT WITH IMMUNOSUPPRESSIVE MEDICATION: ICD-10-CM

## 2023-01-19 PROCEDURE — 99214 PR OFFICE/OUTPT VISIT, EST, LEVL IV, 30-39 MIN: ICD-10-PCS | Mod: 95,,, | Performed by: INTERNAL MEDICINE

## 2023-01-19 PROCEDURE — 99214 OFFICE O/P EST MOD 30 MIN: CPT | Mod: 95,,, | Performed by: INTERNAL MEDICINE

## 2023-01-19 RX ORDER — METHOCARBAMOL 500 MG/1
500 TABLET, FILM COATED ORAL 4 TIMES DAILY PRN
Qty: 60 TABLET | Refills: 3 | Status: SHIPPED | OUTPATIENT
Start: 2023-01-19 | End: 2023-06-08

## 2023-01-19 RX ORDER — PREGABALIN 100 MG/1
100 CAPSULE ORAL 2 TIMES DAILY
Qty: 60 CAPSULE | Refills: 5 | Status: SHIPPED | OUTPATIENT
Start: 2023-01-19 | End: 2023-06-08 | Stop reason: SDUPTHER

## 2023-01-19 NOTE — PROGRESS NOTES
The patient location is: Street  The chief complaint leading to consultation is: MCTD follow up   Visit type: Virtual visit with synchronous audio and video  Total time spent with patient: 15 minutes     Each patient to whom he or she provides medical services by telemedicine is:  (1) informed of the relationship between the physician and patient and the respective role of any other health care provider with respect to management of the patient; and (2) notified that he or she may decline to receive medical services by telemedicine and may withdraw from such care at any time.         RHEUMATOLOGY OUTPATIENT CLINIC NOTE    1/19/2023    Attending Rheumatologist: Herrera Gonsales  Primary Care Provider: ROSALIA CLAROS MD   Physician Requesting Consultation: No referring provider defined for this encounter.  Chief Complaint/Reason For Consultation:  No chief complaint on file.      Subjective:       MAHIN Brooks is a 52 y.o. Black or  female with medical history noted below who presents to establish care for MCTD.   Patient use to follow with Dr. Fitch. Last seen in 8/2019.  +RF, ASHIA 1:620, +SSA, +SMRNP, +Centromere, +Chromatin, +RNP   Seems as her Raynaud's started in 2017, gets diagnosed with SSc and started on MMF 10/2017.   Started RTX 10/2018, last infusion 9/2020.   Follows with Dr. Hester at [facility].   Patient reports since Dr. iFtch left [facility], she has not seen a Rheumatologist. She reports following closely at [facility], where she get most of her Care: Pulm, GI, EYE. They are managing her immunosuppressive therapies.   She reports now that for the last couple of months she has been dealing with joint pain in her left shoulder, left elbow, left hip and knee. Had Hip Xray showing DJD. She notes the pain is all the time but it is worse at night as she tries to sleep on that side and cannot fall asleep. She reports minimal morning stiffness. And difficulties with daily tasks. Tried gabapentin for  relief with no benefit. She endorses her breathing is stable as long as she uses the infusion. She notes her hands develops ulcers and become tender at times. Endorses alopecia, oral sores, sicca. Reports recent rash under her left breast which he PCP treated as fungal. It resolved. Her skin tightening has not progressed. Follows with GI for GERD.   No Photosensitivity, Pleuritis/serositis, Easy Bruising, LAD, Miscarriages/Blood clots.    Today  Patient here for follow up.   Last visit care continued for MTCD. She notes she had her RTX infusion in November, did well. Notes she has her upcoming lung studies in February. Her main issue at the moment is increased Paraesthesias of the left side of her body and spasms/myalgias. She feels as her body got use to the medicine. Tolerating meds.       Review of Systems   Constitutional:  Negative for appetite change, chills, fatigue, fever and unexpected weight change.   HENT:  Negative for nasal congestion, ear discharge, ear pain, hearing loss, mouth sores, nosebleeds, sneezing, sore throat, tinnitus and trouble swallowing.    Eyes:  Negative for photophobia, pain, discharge, redness, itching and visual disturbance.   Respiratory:  Negative for cough, chest tightness, shortness of breath and wheezing.    Cardiovascular:  Negative for chest pain, palpitations and leg swelling.   Gastrointestinal:  Negative for abdominal distention, abdominal pain, blood in stool, constipation, diarrhea, nausea, vomiting and reflux.   Endocrine: Negative for cold intolerance, heat intolerance, polydipsia, polyphagia and polyuria.   Genitourinary:  Negative for difficulty urinating, dyspareunia, dysuria, flank pain, frequency, genital sores, hematuria, menstrual problem, pelvic pain, urgency, vaginal bleeding, vaginal discharge, vaginal pain and vaginal dryness.   Musculoskeletal:  Negative for arthralgias, back pain, gait problem, joint swelling, leg pain, myalgias, neck pain, neck stiffness  and joint deformity.   Integumentary:  Negative for pallor and rash.   Neurological:  Negative for dizziness, seizures, weakness, light-headedness, numbness and headaches.   Hematological:  Negative for adenopathy. Does not bruise/bleed easily.   Psychiatric/Behavioral:  Negative for confusion, decreased concentration and sleep disturbance. The patient is not nervous/anxious.    All other systems reviewed and are negative.     Chronic comorbid conditions affecting medical decision making today:  No past medical history on file.  Past Surgical History:   Procedure Laterality Date    HYSTERECTOMY       Family History   Problem Relation Age of Onset    Hypertension Mother     Stroke Mother     Diabetes Mother     Coronary artery disease Mother     Heart attack Mother     Hypertension Father     Hyperlipidemia Father     Cancer Father         lung cancer     Social History     Substance and Sexual Activity   Alcohol Use Yes    Comment: holidays     Social History     Tobacco Use   Smoking Status Never   Smokeless Tobacco Never     Social History     Substance and Sexual Activity   Drug Use Not on file       Current Outpatient Medications:     celecoxib (CELEBREX) 100 MG capsule, Take 1 capsule (100 mg total) by mouth 2 (two) times daily., Disp: 60 capsule, Rfl: 6    cyclobenzaprine (FLEXERIL) 5 MG tablet, Take 1 tablet (5 mg total) by mouth 3 (three) times daily as needed for Muscle spasms., Disp: 60 tablet, Rfl: 6    DULoxetine (CYMBALTA) 30 MG capsule, Take 1 capsule (30 mg total) by mouth once daily., Disp: 30 capsule, Rfl: 11    ergocalciferol (ERGOCALCIFEROL) 50,000 unit Cap, Take 1 capsule (50,000 Units total) by mouth every 7 days., Disp: 12 capsule, Rfl: 0    erythromycin 250 mg EC capsule, , Disp: , Rfl:     estradioL (ESTRACE) 0.01 % (0.1 mg/gram) vaginal cream, , Disp: , Rfl:     fluticasone (FLONASE) 50 mcg/actuation nasal spray, 2 sprays by Each Nare route once daily., Disp: 16 g, Rfl: 2    furosemide  (LASIX) 20 MG tablet, , Disp: , Rfl:     gabapentin (NEURONTIN) 300 MG capsule, Take 2 capsules (600 mg total) by mouth 2 (two) times daily., Disp: 120 capsule, Rfl: 6    hydrOXYchloroQUINE (PLAQUENIL) 200 mg tablet, , Disp: , Rfl:     mirtazapine (REMERON) 30 MG tablet, , Disp: , Rfl:     mycophenolate (CELLCEPT) 500 mg Tab, , Disp: , Rfl:     NIFEdipine (ADALAT CC) 60 MG TbSR, , Disp: , Rfl:     nitroGLYCERIN 2% TD oint (NITRO-BID) 2 % ointment, Apply 0.5 inches topically 3 (three) times daily., Disp: 45 inch, Rfl: 11    nystatin (MYCOSTATIN) ointment, , Disp: , Rfl:     omeprazole (PRILOSEC) 40 MG capsule, , Disp: , Rfl:     pantoprazole (PROTONIX) 40 MG tablet, , Disp: , Rfl:     potassium chloride (KLOR-CON) 10 MEQ TbSR, , Disp: , Rfl:     RESTASIS 0.05 % ophthalmic emulsion, , Disp: , Rfl:     sildenafil (REVATIO) 20 mg Tab, , Disp: , Rfl:     sucralfate (CARAFATE) 100 mg/mL suspension, , Disp: , Rfl:      Objective:         There were no vitals filed for this visit.  Physical Exam  Virtual Visit   Reviewed old and all outside pertinent medical records available.    All lab results personally reviewed and interpreted by me.  Lab Results   Component Value Date    WBC 3.57 (L) 11/27/2020    HGB 10.4 (L) 11/27/2020    HCT 35.4 (L) 11/27/2020    MCV 79 (L) 11/27/2020    MCH 23.1 (L) 11/27/2020    MCHC 29.4 (L) 11/27/2020    RDW 14.6 (H) 11/27/2020     11/27/2020    MPV 10.3 11/27/2020       Lab Results   Component Value Date     11/27/2020    K 3.1 (L) 11/27/2020     11/27/2020    CO2 27 11/27/2020     11/27/2020    BUN 6 11/27/2020    CALCIUM 9.2 11/27/2020    PROT 8.5 (H) 11/27/2020    ALBUMIN 4.0 11/27/2020    BILITOT 0.2 11/27/2020    AST 15 11/27/2020    ALKPHOS 167 (H) 11/27/2020    ALT 9 (L) 11/27/2020       Lab Results   Component Value Date    COLORU Yellow 01/03/2017    APPEARANCEUA Hazy (A) 01/03/2017    SPECGRAV 1.015 01/03/2017    PHUR 5.0 01/03/2017    PROTEINUA Negative  01/03/2017    KETONESU Negative 01/03/2017    LEUKOCYTESUR Negative 01/03/2017    NITRITE Negative 01/03/2017    UROBILINOGEN Negative 01/03/2017       Lab Results   Component Value Date    CRP 6.5 11/27/2020       Lab Results   Component Value Date    SEDRATE 57 (H) 11/27/2020       Lab Results   Component Value Date    SEDRATE 57 (H) 11/27/2020       No components found for: 25OHVITDTOT, 72BZOLFK8, 80WAWQCS3, METHODNOTE    No results found for: URICACID    No components found for: TSPOTTB      Imaging:  All imaging reviewed and independently interpreted by me.         ASSESSMENT / PLAN:     Megan Brooks is a 52 y.o. Black or  female with:      1. Mixed Connective Tissue Disease  - Patient with features of Sjogren's and Scleroderma, she has sicca, sclerodactyly, ILD, GERD, Skin tightening, oral sores, alopecia, Raynaud's   - she is currently under the care of both Pulmonary and GI at [facility]   - she is under immunosuppressive therapy: RTX (last infusion 11/22), MMF, HCQ  - I agree with current management and will leave the choice of IS therapy to them   - CCB+Sildenafil, Nitro Paste PRN, warmth precautions   - has upcoming update on lung studies   - reassurance     2. Fibromyalgia  - notes increased Paraesthesias and spasms, she feels as her body is getting use to the medicine  - will switch Gabapentin to Lyrica  - switch Flexeril to Robaxin   - continue Cymbalta, NSAIDs    - sleep hygiene and stress management reinforced  - reassurance and exercise     3. Osteoarthritis, unspecified osteoarthritis type, unspecified site  - wt loss   - continue NSAIDs   - Reassurance and exercise     4. Chronic NSAID use  - no history of GI bleed or coronary artery disease.  - previous labs without features of CKD.  - clinical significance side effect of long-term NSAID use discussed in detail.  - recommended for alternative therapies for pain management.    5. DMARD Toxicity Screening  - no live vaccines, and vaccines  must be scheduled around RTX dose   - yearly EYE exams  - labs q3 months   - Bactrim MWF     6. Other specified counseling  - over 10 minutes spent regarding below topics:  - Immunization counseling done.  - Weight loss counseling done.  - Nutrition and exercise counseling.  - Limitation of alcohol consumption.  - Regular exercise:  Aerobic and resistance.  - Medication counseling provided.    7. Obesity  - would benefit from decreasing at least 10% of body weight.  - recommended goal of losing 1 lb per week.  - consider nutritionist evaluation.  - would consider screening for MAIKEL per PMD.    No follow-ups on file.    Method of contact with patient concerns: [facility]    Disclaimer:  This note is prepared using voice recognition software and as such is likely to have errors and has not been proof read. Please contact me for questions.     Time spent: 30 minutes in face to face discussion concerning diagnosis, prognosis, review of lab and test results, benefits of treatment as well as management of disease, counseling of patient and coordination of care between various health care providers.  Greater than half the time spent was used for coordination of care and counseling of patient.    Herrera Gonsales M.D.  Rheumatology Department   [facility]

## 2023-06-02 ENCOUNTER — TELEPHONE (OUTPATIENT)
Dept: RHEUMATOLOGY | Facility: CLINIC | Age: 53
End: 2023-06-02

## 2023-06-08 ENCOUNTER — OFFICE VISIT (OUTPATIENT)
Dept: RHEUMATOLOGY | Facility: CLINIC | Age: 53
End: 2023-06-08
Payer: MEDICARE

## 2023-06-08 VITALS
SYSTOLIC BLOOD PRESSURE: 129 MMHG | HEIGHT: 62 IN | DIASTOLIC BLOOD PRESSURE: 73 MMHG | BODY MASS INDEX: 37.01 KG/M2 | HEART RATE: 76 BPM | WEIGHT: 201.13 LBS

## 2023-06-08 DIAGNOSIS — M35.1 MCTD (MIXED CONNECTIVE TISSUE DISEASE): Primary | ICD-10-CM

## 2023-06-08 DIAGNOSIS — D84.821 IMMUNODEFICIENCY DUE TO TREATMENT WITH IMMUNOSUPPRESSIVE MEDICATION: ICD-10-CM

## 2023-06-08 DIAGNOSIS — Z79.899 IMMUNODEFICIENCY DUE TO TREATMENT WITH IMMUNOSUPPRESSIVE MEDICATION: ICD-10-CM

## 2023-06-08 DIAGNOSIS — Z79.1 NSAID LONG-TERM USE: ICD-10-CM

## 2023-06-08 DIAGNOSIS — M79.7 FIBROMYALGIA: ICD-10-CM

## 2023-06-08 DIAGNOSIS — E66.01 SEVERE OBESITY (BMI 35.0-39.9) WITH COMORBIDITY: ICD-10-CM

## 2023-06-08 DIAGNOSIS — Z71.89 COUNSELING AND COORDINATION OF CARE: ICD-10-CM

## 2023-06-08 DIAGNOSIS — M15.9 PRIMARY OSTEOARTHRITIS INVOLVING MULTIPLE JOINTS: ICD-10-CM

## 2023-06-08 PROCEDURE — 99214 OFFICE O/P EST MOD 30 MIN: CPT | Mod: S$PBB,,, | Performed by: INTERNAL MEDICINE

## 2023-06-08 PROCEDURE — 99999 PR PBB SHADOW E&M-EST. PATIENT-LVL V: ICD-10-PCS | Mod: PBBFAC,,, | Performed by: INTERNAL MEDICINE

## 2023-06-08 PROCEDURE — 99215 OFFICE O/P EST HI 40 MIN: CPT | Mod: PBBFAC,PO | Performed by: INTERNAL MEDICINE

## 2023-06-08 PROCEDURE — 99999 PR PBB SHADOW E&M-EST. PATIENT-LVL V: CPT | Mod: PBBFAC,,, | Performed by: INTERNAL MEDICINE

## 2023-06-08 PROCEDURE — 99214 PR OFFICE/OUTPT VISIT, EST, LEVL IV, 30-39 MIN: ICD-10-PCS | Mod: S$PBB,,, | Performed by: INTERNAL MEDICINE

## 2023-06-08 RX ORDER — PREGABALIN 200 MG/1
200 CAPSULE ORAL 2 TIMES DAILY
Qty: 60 CAPSULE | Refills: 5 | Status: SHIPPED | OUTPATIENT
Start: 2023-06-08 | End: 2023-10-11 | Stop reason: SDUPTHER

## 2023-06-08 RX ORDER — METRONIDAZOLE 500 MG/1
500 TABLET ORAL 2 TIMES DAILY
COMMUNITY
Start: 2023-06-07

## 2023-06-08 RX ORDER — GABAPENTIN 300 MG/1
CAPSULE ORAL
COMMUNITY
Start: 2023-06-07

## 2023-06-08 RX ORDER — CYCLOBENZAPRINE HCL 5 MG
5 TABLET ORAL 3 TIMES DAILY PRN
Qty: 60 TABLET | Refills: 6 | Status: SHIPPED | OUTPATIENT
Start: 2023-06-08 | End: 2023-10-11 | Stop reason: SDUPTHER

## 2023-06-08 RX ORDER — SULFAMETHOXAZOLE AND TRIMETHOPRIM 800; 160 MG/1; MG/1
1 TABLET ORAL
COMMUNITY
Start: 2023-05-16

## 2023-06-08 NOTE — PROGRESS NOTES
Answers submitted by the patient for this visit:  Rheumatology Questionnaire (Submitted on 6/8/2023)  fever: No  eye redness: No  mouth sores: No  headaches: No  shortness of breath: No  chest pain: Yes  trouble swallowing: Yes  diarrhea: No  constipation: No  unexpected weight change: No  genital sore: No  dysuria: No  During the last 3 days, have you had a skin rash?: Yes  Bruises or bleeds easily: No  cough: No           RHEUMATOLOGY OUTPATIENT CLINIC NOTE    6/8/2023    Attending Rheumatologist: Herrera Gonsales  Primary Care Provider: ROSALIA CLAROS MD   Physician Requesting Consultation: No referring provider defined for this encounter.  Chief Complaint/Reason For Consultation:  connective tissue disease and Pain      Subjective:       HPI  Megan Brooks is a 52 y.o. Black or  female with medical history noted below who presents to establish care for MCTD.   Patient use to follow with Dr. Fitch. Last seen in 8/2019.  +RF, ASHIA 1:620, +SSA, +SMRNP, +Centromere, +Chromatin, +RNP   Seems as her Raynaud's started in 2017, gets diagnosed with SSc and started on MMF 10/2017.   Started RTX 10/2018, last infusion 9/2020.   Follows with Dr. Hester at [facility].   Patient reports since Dr. Fitch left [facility], she has not seen a Rheumatologist. She reports following closely at [facility], where she get most of her Care: Pulm, GI, EYE. They are managing her immunosuppressive therapies.   She reports now that for the last couple of months she has been dealing with joint pain in her left shoulder, left elbow, left hip and knee. Had Hip Xray showing DJD. She notes the pain is all the time but it is worse at night as she tries to sleep on that side and cannot fall asleep. She reports minimal morning stiffness. And difficulties with daily tasks. Tried gabapentin for relief with no benefit. She endorses her breathing is stable as long as she uses the infusion. She notes her hands develops ulcers and become tender at times.  Endorses alopecia, oral sores, sicca. Reports recent rash under her left breast which he PCP treated as fungal. It resolved. Her skin tightening has not progressed. Follows with GI for GERD.   No Photosensitivity, Pleuritis/serositis, Easy Bruising, LAD, Miscarriages/Blood clots.    Today  Patient here for follow up.   Last visit care continued for MTCD. We switched to Lyrica and Robaxin, but she reports Robaxin not covered, is taking Lyrica, mild improvement of pain. She notes rash on her back that burns when showering otherwise its just there, though has lesions on arms as well, concern for Shingles. She reports Hx of shingles and this does not feel the same. Had RTX last week, no issues. Also notes left 5th toe pain. Tolerating meds.       Review of Systems   Constitutional:  Negative for appetite change, chills, fatigue, fever and unexpected weight change.   HENT:  Negative for nasal congestion, ear discharge, ear pain, hearing loss, mouth sores, nosebleeds, sneezing, sore throat, tinnitus and trouble swallowing.    Eyes:  Negative for photophobia, pain, discharge, redness, itching and visual disturbance.   Respiratory:  Negative for cough, chest tightness, shortness of breath and wheezing.    Cardiovascular:  Negative for chest pain, palpitations and leg swelling.   Gastrointestinal:  Negative for abdominal distention, abdominal pain, blood in stool, constipation, diarrhea, nausea, vomiting and reflux.   Endocrine: Negative for cold intolerance, heat intolerance, polydipsia, polyphagia and polyuria.   Genitourinary:  Negative for difficulty urinating, dyspareunia, dysuria, flank pain, frequency, genital sores, hematuria, menstrual problem, pelvic pain, urgency, vaginal bleeding, vaginal discharge, vaginal pain and vaginal dryness.   Musculoskeletal:  Negative for arthralgias, back pain, gait problem, joint swelling, leg pain, myalgias, neck pain, neck stiffness and joint deformity.   Integumentary:  Negative  for pallor and rash.   Neurological:  Negative for dizziness, seizures, weakness, light-headedness, numbness and headaches.   Hematological:  Negative for adenopathy. Does not bruise/bleed easily.   Psychiatric/Behavioral:  Negative for confusion, decreased concentration and sleep disturbance. The patient is not nervous/anxious.    All other systems reviewed and are negative.     Chronic comorbid conditions affecting medical decision making today:  History reviewed. No pertinent past medical history.  Past Surgical History:   Procedure Laterality Date    HYSTERECTOMY       Family History   Problem Relation Age of Onset    Hypertension Mother     Stroke Mother     Diabetes Mother     Coronary artery disease Mother     Heart attack Mother     Hypertension Father     Hyperlipidemia Father     Cancer Father         lung cancer     Social History     Substance and Sexual Activity   Alcohol Use Yes    Comment: holidays     Social History     Tobacco Use   Smoking Status Never   Smokeless Tobacco Never     Social History     Substance and Sexual Activity   Drug Use Not on file       Current Outpatient Medications:     celecoxib (CELEBREX) 100 MG capsule, Take 1 capsule (100 mg total) by mouth 2 (two) times daily., Disp: 60 capsule, Rfl: 6    ergocalciferol (ERGOCALCIFEROL) 50,000 unit Cap, Take 1 capsule (50,000 Units total) by mouth every 7 days., Disp: 12 capsule, Rfl: 0    furosemide (LASIX) 20 MG tablet, , Disp: , Rfl:     gabapentin (NEURONTIN) 300 MG capsule, Take by mouth., Disp: , Rfl:     metroNIDAZOLE (FLAGYL) 500 MG tablet, Take 500 mg by mouth 2 (two) times daily., Disp: , Rfl:     mycophenolate (CELLCEPT) 500 mg Tab, , Disp: , Rfl:     NIFEdipine (ADALAT CC) 60 MG TbSR, , Disp: , Rfl:     nitroGLYCERIN 2% TD oint (NITRO-BID) 2 % ointment, Apply 0.5 inches topically 3 (three) times daily., Disp: 45 inch, Rfl: 11    nystatin (MYCOSTATIN) ointment, , Disp: , Rfl:     omeprazole (PRILOSEC) 40 MG capsule, , Disp: ,  Rfl:     potassium chloride (KLOR-CON) 10 MEQ TbSR, , Disp: , Rfl:     RESTASIS 0.05 % ophthalmic emulsion, , Disp: , Rfl:     sildenafil (REVATIO) 20 mg Tab, , Disp: , Rfl:     sucralfate (CARAFATE) 100 mg/mL suspension, , Disp: , Rfl:     sulfamethoxazole-trimethoprim 800-160mg (BACTRIM DS) 800-160 mg Tab, Take 1 tablet by mouth 3 (three) times a week., Disp: , Rfl:     cyclobenzaprine (FLEXERIL) 5 MG tablet, Take 1 tablet (5 mg total) by mouth 3 (three) times daily as needed., Disp: 60 tablet, Rfl: 6    DULoxetine (CYMBALTA) 30 MG capsule, Take 1 capsule (30 mg total) by mouth once daily. (Patient not taking: Reported on 6/8/2023), Disp: 30 capsule, Rfl: 11    erythromycin 250 mg EC capsule, , Disp: , Rfl:     estradioL (ESTRACE) 0.01 % (0.1 mg/gram) vaginal cream, , Disp: , Rfl:     fluticasone (FLONASE) 50 mcg/actuation nasal spray, 2 sprays by Each Nare route once daily. (Patient not taking: Reported on 6/8/2023), Disp: 16 g, Rfl: 2    hydrOXYchloroQUINE (PLAQUENIL) 200 mg tablet, , Disp: , Rfl:     mirtazapine (REMERON) 30 MG tablet, , Disp: , Rfl:     pantoprazole (PROTONIX) 40 MG tablet, , Disp: , Rfl:     pregabalin (LYRICA) 200 MG Cap, Take 1 capsule (200 mg total) by mouth 2 (two) times daily., Disp: 60 capsule, Rfl: 5     Objective:         Vitals:    06/08/23 1121   BP: 129/73   Pulse: 76     Physical Exam  Has rash on her upper back, though not vesicular, and similar lesions on both arms  Left 5th toe with scab and sole with pustular like lesion   Salt and pepper skin on shins   +tender points     Reviewed old and all outside pertinent medical records available.    All lab results personally reviewed and interpreted by me.  Lab Results   Component Value Date    WBC 3.57 (L) 11/27/2020    HGB 10.4 (L) 11/27/2020    HCT 35.4 (L) 11/27/2020    MCV 79 (L) 11/27/2020    MCH 23.1 (L) 11/27/2020    MCHC 29.4 (L) 11/27/2020    RDW 14.6 (H) 11/27/2020     11/27/2020    MPV 10.3 11/27/2020       Lab  Results   Component Value Date     11/27/2020    K 3.1 (L) 11/27/2020     11/27/2020    CO2 27 11/27/2020     11/27/2020    BUN 6 11/27/2020    CALCIUM 9.2 11/27/2020    PROT 8.5 (H) 11/27/2020    ALBUMIN 4.0 11/27/2020    BILITOT 0.2 11/27/2020    AST 15 11/27/2020    ALKPHOS 167 (H) 11/27/2020    ALT 9 (L) 11/27/2020       Lab Results   Component Value Date    COLORU Yellow 01/03/2017    APPEARANCEUA Hazy (A) 01/03/2017    SPECGRAV 1.015 01/03/2017    PHUR 5.0 01/03/2017    PROTEINUA Negative 01/03/2017    KETONESU Negative 01/03/2017    LEUKOCYTESUR Negative 01/03/2017    NITRITE Negative 01/03/2017    UROBILINOGEN Negative 01/03/2017       Lab Results   Component Value Date    CRP 6.5 11/27/2020       Lab Results   Component Value Date    SEDRATE 57 (H) 11/27/2020       Lab Results   Component Value Date    SEDRATE 57 (H) 11/27/2020       No components found for: 25OHVITDTOT, 90DJILVP2, 73IJHBCE5, METHODNOTE    No results found for: URICACID    No components found for: TSPOTTB      Imaging:  All imaging reviewed and independently interpreted by me.         ASSESSMENT / PLAN:     Megan Brooks is a 52 y.o. Black or  female with:      1. Mixed Connective Tissue Disease  - Patient with features of Sjogren's and Scleroderma, she has sicca, sclerodactyly, ILD, GERD, Skin tightening, oral sores, alopecia, Raynaud's   - she is currently under the care of both Pulmonary and GI at [facility]   - she is under immunosuppressive therapy: RTX (last infusion 5/30/23), MMF, HCQ  - I agree with current management and will leave the choice of IS therapy to them   - CCB+Sildenafil, Nitro Paste PRN, warmth precautions   - will refer to DERM for rash eval.   - not clear about lesion on toe, will refer to Podiatry   - reassurance     2. Fibromyalgia  - stable  - increase Lyrica to 200mg BID  - continue Flexeril, Cymbalta, NSAIDs   - sleep hygiene and stress management reinforced  - reassurance and exercise      3. Osteoarthritis, unspecified osteoarthritis type, unspecified site  - chronic  - wt loss   - continue NSAIDs   - Reassurance and exercise     4. Chronic NSAID use  - no history of GI bleed or coronary artery disease.  - previous labs without features of CKD.  - clinical significance side effect of long-term NSAID use discussed in detail.  - recommended for alternative therapies for pain management.    5. DMARD Toxicity Screening  - no live vaccines, and vaccines must be scheduled around RTX dose   - yearly EYE exams  - labs q3 months   - Bactrim [id number]     6. Other specified counseling  - over 10 minutes spent regarding below topics:  - Immunization counseling done.  - Weight loss counseling done.  - Nutrition and exercise counseling.  - Limitation of alcohol consumption.  - Regular exercise:  Aerobic and resistance.  - Medication counseling provided.    7. Obesity  - would benefit from decreasing at least 10% of body weight.  - recommended goal of losing 1 lb per week.  - consider nutritionist evaluation.  - would consider screening for MAIKEL per PMD.    Follow up in about 4 months (around 10/8/2023).    Method of contact with patient concerns: [facility]    Disclaimer:  This note is prepared using voice recognition software and as such is likely to have errors and has not been proof read. Please contact me for questions.     Time spent: 30 minutes in face to face discussion concerning diagnosis, prognosis, review of lab and test results, benefits of treatment as well as management of disease, counseling of patient and coordination of care between various health care providers.  Greater than half the time spent was used for coordination of care and counseling of patient.    Herrera Gonsales M.D.  Rheumatology Department   [facility]

## 2023-06-22 ENCOUNTER — OFFICE VISIT (OUTPATIENT)
Dept: PODIATRY | Facility: CLINIC | Age: 53
End: 2023-06-22
Payer: MEDICARE

## 2023-06-22 ENCOUNTER — HOSPITAL ENCOUNTER (OUTPATIENT)
Dept: RADIOLOGY | Facility: HOSPITAL | Age: 53
Discharge: HOME | End: 2023-06-22
Attending: PODIATRIST
Payer: MEDICARE

## 2023-06-22 VITALS — BODY MASS INDEX: 37 KG/M2 | WEIGHT: 201.06 LBS | HEIGHT: 62 IN

## 2023-06-22 DIAGNOSIS — M20.42 HAMMER TOE OF LEFT FOOT: ICD-10-CM

## 2023-06-22 DIAGNOSIS — M35.1 MCTD (MIXED CONNECTIVE TISSUE DISEASE): ICD-10-CM

## 2023-06-22 DIAGNOSIS — B35.3 TINEA PEDIS OF LEFT FOOT: ICD-10-CM

## 2023-06-22 DIAGNOSIS — L84 CORN OR CALLUS: ICD-10-CM

## 2023-06-22 DIAGNOSIS — M79.675 TOE PAIN, LEFT: ICD-10-CM

## 2023-06-22 DIAGNOSIS — M79.675 TOE PAIN, LEFT: Primary | ICD-10-CM

## 2023-06-22 PROCEDURE — 73630 X-RAY EXAM OF FOOT: CPT | Mod: TC,FY,PO,LT

## 2023-06-22 PROCEDURE — 99203 OFFICE O/P NEW LOW 30 MIN: CPT | Mod: S$PBB,,, | Performed by: PODIATRIST

## 2023-06-22 PROCEDURE — 73630 XR FOOT COMPLETE 3 VIEW LEFT: ICD-10-PCS | Mod: 26,LT,, | Performed by: RADIOLOGY

## 2023-06-22 PROCEDURE — 99215 OFFICE O/P EST HI 40 MIN: CPT | Mod: PBBFAC,PO | Performed by: PODIATRIST

## 2023-06-22 PROCEDURE — 73630 X-RAY EXAM OF FOOT: CPT | Mod: 26,LT,, | Performed by: RADIOLOGY

## 2023-06-22 PROCEDURE — 99203 PR OFFICE/OUTPT VISIT, NEW, LEVL III, 30-44 MIN: ICD-10-PCS | Mod: S$PBB,,, | Performed by: PODIATRIST

## 2023-06-22 PROCEDURE — 99999 PR PBB SHADOW E&M-EST. PATIENT-LVL V: CPT | Mod: PBBFAC,,, | Performed by: PODIATRIST

## 2023-06-22 PROCEDURE — 99999 PR PBB SHADOW E&M-EST. PATIENT-LVL V: ICD-10-PCS | Mod: PBBFAC,,, | Performed by: PODIATRIST

## 2023-06-22 NOTE — PATIENT INSTRUCTIONS
Over the counter pain creams: Voltaren Gel, Biofreeze, Bengay, tiger balm, two old goat, lidocaine gel,  Absorbine Veterinary Liniment Gel Topical Analgesic Sore Muscle and Joint Pain Relief  Recommend lotions: eucerin, eucerin for diabetics, aquaphor, A&D ointment, gold bond for diabetics, sween, Hazleton's Bees all purpose baby ointment,  urea 40 with aloe or SkinIntegra rapid crack repair (found on amazon.com)  Shoe recommendations: (try 6pm.com, zappos.com , nordstromrack.SmartStart, or shoes.SmartStart for discounted prices) you can visit varsity shoes in [place], DSW shoes in [place]  or patricia rack in the [facility] (there are also several shoe brand outlets in the [facility])  ONLY purchase stability style tennis shoes NO flex, foam, free, yoga mat style shoes  Asics (GT 4000 or gel foundations), new balance stability type shoes (such as the 940 series), saucony (stabil c3),  Cote (GTS or Beast or transcend), propet, HokaOne (tennis shoe) Rock (tennis shoes and boots)  Sofft Brand (women) Roderick&Tomas (men), clarks, crocs, aerosoles, naturalizers, SAS, ecco, born, wendy dailey, rockports (dress shoes)  Vionic, burkenstocks, fitflops, propet, taos, baretraps (sandals)  HokaOne sandals, crocs, propet (house shoes)    Nail Home remedy:  Vicks Vapor rub or Emuaid to nails for easier manageability      What Are Corns and Calluses?    Corns and calluses are your bodys response to friction or pressure against the skin. If your foot rubs the inside of your shoe, the affected area of skin thickens. Or, if a bone is not in the normal position, skin caught between bone and shoe or bone and ground builds up. In either case, the outer layer of skin thickens to protect the foot from unusual pressure. In many cases, corns and calluses look bad but are not harmful. However, more severe corns and calluses may become infected, destroy healthy tissue, or affect foot movement.    Corns  Corns usually grow on top of the foot, often at  the toe joint. Corns can range from a slight thickening of skin to a painful soft or hard bump. They often form on top of buckled toe joints (hammer toes). If your toes curl under, corns may grow on the tips of the toes. You may also get a corn on the end of a toe if it rubs against your shoe. Corns can also grow between toes, often between the first and second toes.    Calluses  Calluses grow on the bottom of the foot or on the outer edge of a toe or heel. A callus may spread across the ball of your foot. This type of callus is usually due to a problem with a metatarsal (the long bone at the base of a toe, near the ball of the foot). A pinch callus may grow along the outer edge of the heel or the big toe. Some calluses press up into the foot instead of spreading on the outside. A callus may form a central core or plug of tissue where pressure is greatest.  Date Last Reviewed: 9/21/2015 © 2000-2016 [employer]. [address] [place] [address]. All rights reserved. This information is not intended as a substitute for professional medical care. Always follow your healthcare professional's instructions.        Treating Corns and Calluses  If your corns or calluses are mild, reducing friction may help. Different shoes, moleskin patches, or soft pads may be all the treatment you need. In more severe cases, treating tissue buildup may require your doctors care. Sometimes custom-made shoe inserts (orthotics) or special pads are prescribed to reduce friction and pressure.    Change shoes  If you have corns, your doctor may suggest wearing shoes that have more toe room. This way, buckled joints are less likely to be pinched against the top of the shoe. If you have calluses, wearing a cushioned insole, arch support, or heel counter can help reduce friction.  Visit your doctor  In some cases, your doctor may trim away the outer layers of skin that make up the corn or callus. For a painful corn,  medicine may be injected beneath the built-up tissue.  Wear orthotics  Orthotics are specially made to meet the needs of your feet. They cushion calluses or divert pressure away from these problem areas. Worn as directed, orthotics help limit existing problems and prevent new ones from forming.  If you need surgery  If a bone or joint is out of place, certain parts of your foot may be under too much pressure. This can cause severe corns and calluses. In such cases, surgery may be the best way to correct the problem.  Outpatient procedures  In most cases, surgery to improve bone position is an outpatient procedure. Your doctor may cut away excess bone, reposition prominent bones, or even fuse joints. Sometimes tendons or ligaments are cut to reduce tension on a bone or joint. Your doctor will talk with you about the procedure that is best suited to your needs.  Date Last Reviewed: 7/1/2016  © 5929-0257 [employer]. [address] [place] [address]. All rights reserved. This information is not intended as a substitute for professional medical care. Always follow your healthcare professional's instructions.        Athletes Foot     Athletes Foot is caused by a fungal infection in the skin. It affects the skin between the toes where it causes fissures (cracks in the skin). It can also affect the bottom of the foot where it causes dry white scales and peeling of the skin. This infection is more likely to occur when the foot is in hot, sweaty socks and shoes for long periods of time.   This infection is treated with skin creams or oral medicine.     Home Care:   It is important to keep the feet dry. Use absorbent cotton socks and change them if they become sweaty; or wear an open-toe shoe or sandal. Wash the feet at least once a day with Nizoral shampoo or dial antibacterial soap and water then dry completely. Avoid soaking and prolonged exposure to water  Rotate your shoes. If you must wear the  same shoes everyday then spray the shoes with lysol or antifungal spray and allow that to dry overnight before wearing the shoes again  Apply the antifungal cream as prescribed. Some antifungal creams are available without a prescription (Lotrimin, Tinactin).   It may take 2 weeks before the rash starts to improve and it can take about three to ten weeks to completely clear. Continue the medicine until the rash is all gone.   Use over-the-counter antifungal powders or sprays on your feet after exposure to high-risk environments (public showers, gyms and locker rooms) may prevent future infections. You may wish to use appropriate footwear to reduce exposure.  Clean tubs and bathroom floor with bleach    Follow Up   with your doctor as recommended by our staff if the rash is not starting to improve after TEN days of treatment, or if the rash continues to spread.     Get Prompt Medical Attention   if any of the following occur:   Increasing redness or swelling of the foot   Pus draining from cracks in the skin   Fever of 100.4ºF (38ºC) or higher, or as directed by your healthcare provider    © 7972-1107 [place] [address]. All rights reserved. This information is not intended as a substitute for professional medical care. Always follow your healthcare professional's instructions.               Understanding Thickened Nails    There are several causes of very thick or crumbling nails. They can be caused by injuries or pressure from shoes. Fungal infections are a common cause. Diabetes, psoriasis, or vascular disease are other possible causes.  Symptoms  Along with thickening, the nail may appear ridged, brittle, or yellowish. It may also feel painful when pressure is put on it. After a while, a thickened nail may loosen and fall off.  Evaluation  Because thickened nails may be a symptom of a medical condition, your healthcare provider will look at your medical history. A test may  be done to check for fungal infection. The thickness and color of the nail are examined carefully. This helps determine the cause.  Treatment  For infection: Oral or topical antifungal medicines may be used to treat infection. These can help prevent sores under the nail. They also keep the fungus from spreading to other nails.  For thick nails not caused by infection: Thinning the nail may be an option. This can be done by trimming, filing, or grinding.  For pain: If the nail causes pain, part or all of the nail can be removed with surgery. Never try to remove a nail by yourself.  Prevention  Many nail problems can be prevented by wearing the right shoes and trimming your nails properly. Keep your feet clean and dry to help avoid infection. If you have diabetes, talk with your healthcare provider before doing any foot self-care.  The right shoes: Get your feet measured. Your size may change as you age. This is due to ligaments that loosen with age and allow the bones in your foot to change position or spread. Wear shoes that are supportive and roomy enough for your toes to wiggle. Look for shoes made of natural materials, such as leather, which allow your feet to breathe.  Proper trimming: To avoid problems, trim your toenails straight across. Then file the edges with a nail file. If you cant trim your own nails, ask your healthcare provider to do so for you.        Wearing Proper Shoes                    You walk on your feet every day, forcing them to support the weight of your body. Repeated stress on your feet can cause damage over time. The right shoes can help protect your feet. The wrong shoes can cause more foot problems. Read the information below to help you find a shoe that fits your foot needs.     A good shoe fit will cover your foot outline.       A shoe that doesnt cover the outline is a bad fit.      Whats Your Foot Shape?  To get a good fit, you need to know the shape of your foot. Do this simple  test: While standing, place your foot on a piece of paper and trace around it. Is your foot straight or curved? Do you have a foot problem, such as a bunion, that causes your foot outline to show a bulge on the side of your big toe?  Finding Your Fit  Bring your foot outline to the shore store to help you find the right shoe. Place a shoe you like on top of the outline to see if it matches the shape. The shoe should cover the outline. (If you have a bunion, the shoe may not cover the bulge on the outline. Look for soft leather shoes to stretch over the bunion.) Once youve found a pair of proper shoes, put them on. Walk around. Be sure the shoes dont rub or pinch. If the shoes feel good, youve found your fit!  The Right Shoe for You  A good shoe has features that provide comfort and support. It must also be the right size and shape for your feet. Look for a shoe made of breathable fabric and lining, such as leather or canvas. Be sure that shoes have enough tread to prevent slipping. Go to a good shoe store for help finding the right shoe.  Good Shoe Features  An ideal shoe has the following:  Laces for support. If tying laces is a problem for you, try shoes with Velcro fasteners or sarvaanan.  A front of the shoe (toe box) with ½ inch space in front of your longest toes.  An arch shape that supports your foot.  No more than 1½ inches of heel.  A stiff, snug back of the shoe to keep your foot from sliding around.  A smooth lining with no rough seams.  Shoe Shopping Tips  Below are some dos and donts for when you go to the shoe store.  Do:  Select the shoes that feel right. Wear them around the house. Then bring them to your foot doctor to check for fit. If they dont fit well, return them.  Shop late in the day, when your feet will be slightly bigger.  Each time you buy shoes, have both your feet measured while you are standing. Foot size changes with time.  Pick shoes to suit their purpose. High heels are okay for  an occasional night on the town. But for everyday wear, choose a more sensible shoe.  Try on shoes while wearing any inserts specially made for your feet (orthoses).  Try on both the right and left shoes. If your feet are different sizes, pick a pair that fits the larger foot.  Dont:  Dont buy shoes based on shoe size alone. Always try on shoes, as sizes differ from brand to brand and within brands.  Dont expect shoes to break in. If they dont fit at the store, dont buy them.  Dont buy a shoe that doesnt match your foot shape.  What About Socks?  Always wear socks with shoes. Socks help absorb sweat and reduce friction and blistering. When shopping for shoes, choose soft, padded socks with seams that dont irritate your feet.  If You Have Foot Problems  Some foot problems cause deformities. This can make it hard to find a good fit. Look for shoes made of soft leather to stretch over the deformity. If you have bunions, buy shoes with a wider toe box. To fit hammertoes, look for shoes with a tall toe box. If you have arch problems, you may need inserts. In some cases, youll need to have custom footwear or orthoses made for your feet.  Suggested Footwear  Ask your healthcare provider what kind of footwear you need. He or she may recommend a certain brand or shoe store.  © 0193-5153 [employer]. [address] [place] [address], [place] [address]. All rights reserved. This information is not intended as a substitute for professional medical care. Always follow your healthcare professional's instructions.        Toe Extension (Flexibility)    These instructions are for your right foot. Switch sides for your left foot.  Sit in a chair. Rest your right ankle on your left knee.  Hold your toes with your right hand. Gently bend the toes backward. Feel a stretch in the undersides of the toes and ball of the foot. Hold for 30 to 60 seconds.  Then gently bend the toes in the other direction. Gently press on them  until your foot is pointed. Hold for 30 to 60 seconds.  Repeat 5 times, or as instructed.  © 1318-9669 [employer]. [address] [place] [address], [place] [address]. All rights reserved. This information is not intended as a substitute for professional medical care. Always follow your healthcare professional's instructions.

## 2023-06-22 NOTE — PROGRESS NOTES
Subjective:      Patient ID: Megan Brooks is a 52 y.o. female.    Chief Complaint: Foot Problem (Leison and rash on left foot)      Megan Brooks is a 52 y.o. female who presents to the podiatry clinic  with complaint of  left foot pain, especially with palpation and ambulation in certain shoes. Description: moderate Nature: aching and sharp Location: 5th toe. Onset of the symptoms was several months ago. Precipitating event: none known.  History of injury: no Current symptoms include: ability to bear weight, but with some pain and swelling. Alleviating factors: rest Symptoms have progressed to a point and plateaued.  Patients rates pain 10/10 on pain scale. Also complains of rash treated with nitro cream. Per rheumatology she has features of Sjogren's and Scleroderma, she has sicca, sclerodactyly, ILD, GERD, Skin tightening, oral sores, alopecia, Raynaud's. She is under immunosuppressive therapy: RTX (last infusion 5/30/23), MMF, HCQ.     Shoe gear: Leslie   Wears house shoes from [employer] in the home    Patient Active Problem List   Diagnosis    Posture imbalance    Chronic left shoulder pain    Severe obesity (BMI 35.0-39.9) with comorbidity       Current Outpatient Medications on File Prior to Visit   Medication Sig Dispense Refill    celecoxib (CELEBREX) 100 MG capsule Take 1 capsule (100 mg total) by mouth 2 (two) times daily. 60 capsule 6    cyclobenzaprine (FLEXERIL) 5 MG tablet Take 1 tablet (5 mg total) by mouth 3 (three) times daily as needed. 60 tablet 6    ergocalciferol (ERGOCALCIFEROL) 50,000 unit Cap Take 1 capsule (50,000 Units total) by mouth every 7 days. 12 capsule 0    erythromycin 250 mg EC capsule       estradioL (ESTRACE) 0.01 % (0.1 mg/gram) vaginal cream       fluticasone (FLONASE) 50 mcg/actuation nasal spray 2 sprays by Each Nare route once daily. 16 g 2    furosemide (LASIX) 20 MG tablet       gabapentin (NEURONTIN) 300 MG capsule Take by mouth.      hydrOXYchloroQUINE (PLAQUENIL) 200  mg tablet       metroNIDAZOLE (FLAGYL) 500 MG tablet Take 500 mg by mouth 2 (two) times daily.      mirtazapine (REMERON) 30 MG tablet       mycophenolate (CELLCEPT) 500 mg Tab       NIFEdipine (ADALAT CC) 60 MG TbSR       nystatin (MYCOSTATIN) ointment       omeprazole (PRILOSEC) 40 MG capsule       pantoprazole (PROTONIX) 40 MG tablet       potassium chloride (KLOR-CON) 10 MEQ TbSR       pregabalin (LYRICA) 200 MG Cap Take 1 capsule (200 mg total) by mouth 2 (two) times daily. 60 capsule 5    RESTASIS 0.05 % ophthalmic emulsion       sildenafil (REVATIO) 20 mg Tab       sucralfate (CARAFATE) 100 mg/mL suspension       sulfamethoxazole-trimethoprim 800-160mg (BACTRIM DS) 800-160 mg Tab Take 1 tablet by mouth 3 (three) times a week.      DULoxetine (CYMBALTA) 30 MG capsule Take 1 capsule (30 mg total) by mouth once daily. (Patient not taking: Reported on 6/8/2023) 30 capsule 11    nitroGLYCERIN 2% TD oint (NITRO-BID) 2 % ointment Apply 0.5 inches topically 3 (three) times daily. 45 inch 11     No current facility-administered medications on file prior to visit.       Review of patient's allergies indicates:  No Known Allergies    Past Surgical History:   Procedure Laterality Date    HYSTERECTOMY         Family History   Problem Relation Age of Onset    Hypertension Mother     Stroke Mother     Diabetes Mother     Coronary artery disease Mother     Heart attack Mother     Hypertension Father     Hyperlipidemia Father     Cancer Father         lung cancer       Social History     Socioeconomic History    Marital status: Single   Occupational History    Occupation:    Tobacco Use    Smoking status: Never    Smokeless tobacco: Never   Substance and Sexual Activity    Alcohol use: Yes     Comment: holidays    Sexual activity: Not Currently       Review of Systems   Constitutional: Negative for chills and fever.   Cardiovascular:  Negative for claudication and leg swelling.   Respiratory:  Negative for cough and  "shortness of breath.    Skin:  Positive for dry skin, nail changes and rash. Negative for itching.   Musculoskeletal:  Positive for arthritis, joint pain and myalgias. Negative for falls, joint swelling and muscle weakness.   Gastrointestinal:  Negative for diarrhea, nausea and vomiting.   Neurological:  Positive for paresthesias and weakness. Negative for numbness and tremors.   Psychiatric/Behavioral:  Negative for altered mental status and hallucinations.          Objective:      Vitals:    06/22/23 0932   Weight: 91.2 kg (201 lb 1 oz)   Height: 5' 2" (1.575 m)   PainSc: 10-Worst pain ever       Physical Exam  Vitals and nursing note reviewed.   Constitutional:       General: She is not in acute distress.     Appearance: She is well-developed. She is not toxic-appearing or diaphoretic.      Comments: alert and oriented x 3.    Cardiovascular:      Pulses:           Dorsalis pedis pulses are 2+ on the right side and 2+ on the left side.        Posterior tibial pulses are 2+ on the right side and 2+ on the left side.      Comments:  Capillary refill time is within normal limits. Digital hair present.   Pulmonary:      Effort: No respiratory distress.   Musculoskeletal:         General: No deformity.      Right ankle: No tenderness. No lateral malleolus, medial malleolus, AITF ligament, CF ligament or posterior TF ligament tenderness.      Right Achilles Tendon: No defects. Waterman's test negative.      Left ankle: No tenderness. No lateral malleolus, medial malleolus, AITF ligament, CF ligament or posterior TF ligament tenderness.      Left Achilles Tendon: No defects. Waterman's test negative.      Right foot: No tenderness or bony tenderness.      Left foot: Tenderness (5th toe) present. No bony tenderness.      Comments:   Decreased first MPJ range of motion both weightbearing and nonweightbearing, no crepitus observed the first MP joint, + dorsal flag sign.    Patient has hammertoes of digits 2-5 bilateral " partially reducible          Feet:      Right foot:      Protective Sensation: 5 sites tested.  5 sites sensed.      Skin integrity: Skin integrity normal.      Left foot:      Protective Sensation: 5 sites tested.  5 sites sensed.      Skin integrity: Skin integrity normal.   Lymphadenopathy:      Comments: No lymphatic streaking     Skin:     General: Skin is warm and dry.      Coloration: Skin is not pale.      Findings: No rash.      Nails: There is no clubbing.      Comments: Scaling dryness in a moccasin distribution is noted to the left lower extremities with associated erythema.    Focal hyperkeratotic lesion with painful central core consisting entirely of hyperkeratotic tissue without open skin, drainage, pus, fluctuance, malodor, or signs of infection: left 5th digit              Neurological:      Sensory: No sensory deficit.      Motor: No atrophy.      Comments: Light touch present     Psychiatric:         Attention and Perception: She is attentive.         Mood and Affect: Mood is not anxious. Affect is not inappropriate.         Speech: She is communicative. Speech is not slurred.         Behavior: Behavior is not combative.             Assessment:       Encounter Diagnoses   Name Primary?    MCTD (mixed connective tissue disease)     Toe pain, left Yes    Corn or callus     Tinea pedis of left foot     Hammer toe of left foot          Plan:     Problem List Items Addressed This Visit    None  Visit Diagnoses       Toe pain, left    -  Primary    MCTD (mixed connective tissue disease)        Corn or callus        Tinea pedis of left foot        Hammer toe of left foot                 I counseled the patient on her conditions, their implications and medical management.      I counseled the patient on her conditions, their implications and medical management.    Discussed biomechanical deformity correction surgically vs conservative management     Conservative treatments for hammer digit discussed  include padding, hammertoe crest  Pads, extra-depth shoes; Surgical treatments discussed, including hammertoe correction and generic post-op course. All questions answered. Patient opting to begin with conservative options first.      Cosmetic procedures such as fillers also discussed    Based on chart review this patient does not qualify for nail care/callus trimming (Patients who qualify are usually as follows: diabetic with neurological manifestations, PVD, pernicious anemia, or CKD with appropriate modifiers that indicate high amputation risk).     Patient was advised use of a pumice stone, and skin emollients to slow the progression of callus formation.     Dispensed information on proper shoe fit and modification including inserts. Patient dispensed devices to assist in offloading such as non medicated corn pads    Pt to RTC as needed as procedure B for trimming of callus or if she wishes to pursue surgical intervention.       Tinea like rash to left foot, will defer to dermatology

## 2023-06-26 ENCOUNTER — OFFICE VISIT (OUTPATIENT)
Dept: DERMATOLOGY | Facility: CLINIC | Age: 53
End: 2023-06-26
Payer: MEDICARE

## 2023-06-26 DIAGNOSIS — M35.1 MCTD (MIXED CONNECTIVE TISSUE DISEASE): ICD-10-CM

## 2023-06-26 DIAGNOSIS — L30.9 ECZEMA, UNSPECIFIED TYPE: Primary | ICD-10-CM

## 2023-06-26 PROCEDURE — 99204 OFFICE O/P NEW MOD 45 MIN: CPT | Mod: S$PBB,,, | Performed by: DERMATOLOGY

## 2023-06-26 PROCEDURE — 99999 PR PBB SHADOW E&M-EST. PATIENT-LVL IV: ICD-10-PCS | Mod: PBBFAC,,, | Performed by: DERMATOLOGY

## 2023-06-26 PROCEDURE — 99214 OFFICE O/P EST MOD 30 MIN: CPT | Mod: PBBFAC | Performed by: DERMATOLOGY

## 2023-06-26 PROCEDURE — 99999 PR PBB SHADOW E&M-EST. PATIENT-LVL IV: CPT | Mod: PBBFAC,,, | Performed by: DERMATOLOGY

## 2023-06-26 PROCEDURE — 99204 PR OFFICE/OUTPT VISIT, NEW, LEVL IV, 45-59 MIN: ICD-10-PCS | Mod: S$PBB,,, | Performed by: DERMATOLOGY

## 2023-06-26 RX ORDER — TRIAMCINOLONE ACETONIDE 1 MG/G
CREAM TOPICAL 2 TIMES DAILY
Qty: 454 G | Refills: 1 | Status: SHIPPED | OUTPATIENT
Start: 2023-06-26

## 2023-06-26 NOTE — PROGRESS NOTES
Subjective:      Patient ID:  Megan Brooks is a 52 y.o. female who presents for   Chief Complaint   Patient presents with    Rash     Diffuse      Rash - Initial  Affected locations: diffuse  Duration: Most recent flare was one week ago.  Signs / symptoms: burning  Severity: moderate  Timing: intermittent  Exacerbated by: Running water from showering.  Relieving factors/Treatments tried: OTC hydrocortisone (Baby oil)  Improvement on treatment: no relief    States one provider thought she had shingles, but her rheumatologist didn't think so.    Per rheum note, pt has features of Sjogren's and Scleroderma, she has sicca, sclerodactyly, ILD, GERD, Skin tightening, oral sores, alopecia, Raynaud's   - she is currently under the care of both Pulmonary and GI at [facility]   - she is under immunosuppressive therapy: RTX (last infusion 5/30/23), MMF, HCQ      Review of Systems   Constitutional:  Negative for fever and chills.   Skin:  Positive for rash. Negative for daily sunscreen use, activity-related sunscreen use, recent sunburn and wears hat.   Hematologic/Lymphatic: Does not bruise/bleed easily.     Objective:   Physical Exam   Constitutional: She appears well-developed and well-nourished. No distress.   Neurological: She is alert and oriented to person, place, and time. She is not disoriented.   Psychiatric: She has a normal mood and affect.   Skin:   Areas Examined (abnormalities noted in diagram):   Scalp / Hair Palpated and Inspected  Head / Face Inspection Performed  Neck Inspection Performed  Chest / Axilla Inspection Performed  Back Inspection Performed  RUE Inspected  LUE Inspection Performed  RLE Inspected  LLE Inspection Performed          Diagram Legend     Erythematous scaling macule/papule c/w actinic keratosis       Vascular papule c/w angioma      Pigmented verrucoid papule/plaque c/w seborrheic keratosis      Yellow umbilicated papule c/w sebaceous hyperplasia      Irregularly shaped tan macule c/w lentigo    "  1-2 mm smooth white papules consistent with Milia      Movable subcutaneous cyst with punctum c/w epidermal inclusion cyst      Subcutaneous movable cyst c/w pilar cyst      Firm pink to brown papule c/w dermatofibroma      Pedunculated fleshy papule(s) c/w skin tag(s)      Evenly pigmented macule c/w junctional nevus     Mildly variegated pigmented, slightly irregular-bordered macule c/w mildly atypical nevus      Flesh colored to evenly pigmented papule c/w intradermal nevus       Pink pearly papule/plaque c/w basal cell carcinoma      Erythematous hyperkeratotic cursted plaque c/w SCC      Surgical scar with no sign of skin cancer recurrence      Open and closed comedones      Inflammatory papules and pustules      Verrucoid papule consistent consistent with wart     Erythematous eczematous patches and plaques     Dystrophic onycholytic nail with subungual debris c/w onychomycosis     Umbilicated papule    Erythematous-base heme-crusted tan verrucoid plaque consistent with inflamed seborrheic keratosis     Erythematous Silvery Scaling Plaque c/w Psoriasis     See annotation      Assessment / Plan:        MCTD (mixed connective tissue disease) - features of scleroderma on exam today  With superimposed eczematous dermatitis  -     triamcinolone acetonide 0.1% (KENALOG) 0.1 % cream; Apply topically 2 (two) times daily. x 1-2 wks then prn flares only  Dispense: 454 g; Refill: 1  Good skin care regimen discussed including limiting to one bath or shower/day, using lukewarm water with mild soap and moisturizing cream to skin 1 - 2x/day. Brochure was provided and reviewed with patient.  Recommended CeraVe moisturizing cream, Dove sensitive skin soap, and "free and clear" detergents.    Continue systemic tx per rheum.  No features of shingles on exam today.    Follow up in about 4 months (around 10/26/2023) for skin check or sooner for any concerns.    "

## 2023-06-26 NOTE — PATIENT INSTRUCTIONS
XEROSIS (DRY SKIN)        Definition    Xerosis is the term for dry skin.  We all have a natural oil coating over our skin produced by the skin oil glands.  If this oil is removed, the skin becomes dry which can lead to cracking, which can lead to inflammation.  Xerosis is usually a long-term problem that recurs often, especially in the winter.    Cause    Long hot baths or showers can remove our natural oil and lead to xerosis.  One should never take more than one bath or shower a day and for no longer than ten minutes.  Use of harsh soaps such as Zest, Dial, and Ivory can worsen and cause xerosis.  Cold winter weather worsens xerosis because the amount of moisture contained in cold air is much less than the amount of moisture in warm air.    Treatment    Treatment is intended to restore the natural oil to your skin.  Keep the skin lubricated.    Do not take more than one bath or shower a day.  Use lukewarm water, not hot.  Hot water dries out the skin.    Use a gentle moisturizing soap such as Cetaphil soap, Oil of Olay, Dove, Basis, Ivory moisture care, Restoraderm cleanser.    When toweling dry, dont rub.  Blot the skin so there is still some water left on the skin.  You should apply a moisturizing cream to all of the skin such as Cerave cream, Cetaphil cream, Lipikar [place] AP+ Intense Repair Moisturizing Cream or Restoraderm or Eucerin Original Formula cream.   Alpha hydroxyacid lotions, i.e., AmLactin, also work very well for preventing dry skin, but may burn when used on inflamed or reddened skin.    If you like to swim during the winter months, you should not use soap when getting out of the pool.  When you have finished swimming, rinse off the chlorine with cool to warm water.  If this will be the only shower of the day, then you may use Cetaphil or another mild soap to cleanse your skin.  After the shower, apply a moisturizing cream to all of the skin as above.        [address] [place] [address]/ [phone] FAX/ [email or web address]

## 2023-10-11 ENCOUNTER — OFFICE VISIT (OUTPATIENT)
Dept: RHEUMATOLOGY | Facility: CLINIC | Age: 53
End: 2023-10-11
Payer: MEDICARE

## 2023-10-11 VITALS
WEIGHT: 201.69 LBS | HEIGHT: 62 IN | BODY MASS INDEX: 37.11 KG/M2 | HEART RATE: 64 BPM | SYSTOLIC BLOOD PRESSURE: 130 MMHG | DIASTOLIC BLOOD PRESSURE: 64 MMHG

## 2023-10-11 DIAGNOSIS — D84.821 IMMUNODEFICIENCY DUE TO TREATMENT WITH IMMUNOSUPPRESSIVE MEDICATION: ICD-10-CM

## 2023-10-11 DIAGNOSIS — Z79.1 NSAID LONG-TERM USE: ICD-10-CM

## 2023-10-11 DIAGNOSIS — E66.01 MORBID OBESITY: ICD-10-CM

## 2023-10-11 DIAGNOSIS — Z79.899 IMMUNODEFICIENCY DUE TO TREATMENT WITH IMMUNOSUPPRESSIVE MEDICATION: ICD-10-CM

## 2023-10-11 DIAGNOSIS — Z71.89 COUNSELING AND COORDINATION OF CARE: ICD-10-CM

## 2023-10-11 DIAGNOSIS — M15.9 PRIMARY OSTEOARTHRITIS INVOLVING MULTIPLE JOINTS: ICD-10-CM

## 2023-10-11 DIAGNOSIS — M35.1 MCTD (MIXED CONNECTIVE TISSUE DISEASE): Primary | ICD-10-CM

## 2023-10-11 DIAGNOSIS — M79.7 FIBROMYALGIA: ICD-10-CM

## 2023-10-11 PROCEDURE — 99214 PR OFFICE/OUTPT VISIT, EST, LEVL IV, 30-39 MIN: ICD-10-PCS | Mod: S$PBB,,, | Performed by: INTERNAL MEDICINE

## 2023-10-11 PROCEDURE — 99214 OFFICE O/P EST MOD 30 MIN: CPT | Mod: PBBFAC,PO | Performed by: INTERNAL MEDICINE

## 2023-10-11 PROCEDURE — 99999 PR PBB SHADOW E&M-EST. PATIENT-LVL IV: CPT | Mod: PBBFAC,,, | Performed by: INTERNAL MEDICINE

## 2023-10-11 PROCEDURE — 99999 PR PBB SHADOW E&M-EST. PATIENT-LVL IV: ICD-10-PCS | Mod: PBBFAC,,, | Performed by: INTERNAL MEDICINE

## 2023-10-11 PROCEDURE — 99214 OFFICE O/P EST MOD 30 MIN: CPT | Mod: S$PBB,,, | Performed by: INTERNAL MEDICINE

## 2023-10-11 RX ORDER — CELECOXIB 100 MG/1
100 CAPSULE ORAL 2 TIMES DAILY
Qty: 60 CAPSULE | Refills: 6 | Status: SHIPPED | OUTPATIENT
Start: 2023-10-11

## 2023-10-11 RX ORDER — PREGABALIN 200 MG/1
200 CAPSULE ORAL 2 TIMES DAILY
Qty: 60 CAPSULE | Refills: 5 | Status: SHIPPED | OUTPATIENT
Start: 2023-10-11 | End: 2024-04-10

## 2023-10-11 RX ORDER — CYCLOBENZAPRINE HCL 5 MG
5 TABLET ORAL 3 TIMES DAILY PRN
Qty: 60 TABLET | Refills: 6 | Status: SHIPPED | OUTPATIENT
Start: 2023-10-11

## 2023-10-11 NOTE — PROGRESS NOTES
Answers submitted by the patient for this visit:  Rheumatology Questionnaire (Submitted on 10/9/2023)  fever: No  eye redness: No  mouth sores: No  headaches: Yes  shortness of breath: No  chest pain: No  trouble swallowing: Yes  diarrhea: No  constipation: No  unexpected weight change: No  genital sore: No  dysuria: No  During the last 3 days, have you had a skin rash?: Yes  Bruises or bleeds easily: No  cough: No             RHEUMATOLOGY OUTPATIENT CLINIC NOTE    10/11/2023    Attending Rheumatologist: Herrera Gonsales  Primary Care Provider: Kayley Morales MD   Physician Requesting Consultation: No referring provider defined for this encounter.  Chief Complaint/Reason For Consultation:  No chief complaint on file.      Subjective:       MAHIN Brooks is a 52 y.o. Black or  female with medical history noted below who presents to establish care for MCTD.   Patient use to follow with Dr. Fitch. Last seen in 8/2019.  +RF, ASHIA 1:620, +SSA, +SMRNP, +Centromere, +Chromatin, +RNP   Seems as her Raynaud's started in 2017, gets diagnosed with SSc and started on MMF 10/2017.   Started RTX 10/2018, last infusion 9/2020.   Follows with Dr. Hester at [facility].   Patient reports since Dr. Fitch left [facility], she has not seen a Rheumatologist. She reports following closely at [facility], where she get most of her Care: Pulm, GI, EYE. They are managing her immunosuppressive therapies.   She reports now that for the last couple of months she has been dealing with joint pain in her left shoulder, left elbow, left hip and knee. Had Hip Xray showing DJD. She notes the pain is all the time but it is worse at night as she tries to sleep on that side and cannot fall asleep. She reports minimal morning stiffness. And difficulties with daily tasks. Tried gabapentin for relief with no benefit. She endorses her breathing is stable as long as she uses the infusion. She notes her hands develops ulcers and become tender at times.  Endorses alopecia, oral sores, sicca. Reports recent rash under her left breast which he PCP treated as fungal. It resolved. Her skin tightening has not progressed. Follows with GI for GERD.   No Photosensitivity, Pleuritis/serositis, Easy Bruising, LAD, Miscarriages/Blood clots.    Today  Patient here for follow up.   Last visit care continued for MTCD. Last visit meds continued. She is under management with Pulm. Notes paraesthesias as well as bumps in her legs at times. Breathing stable. Tolerating meds.    Review of Systems   Constitutional:  Negative for appetite change, chills, fatigue, fever and unexpected weight change.   HENT:  Negative for nasal congestion, ear discharge, ear pain, hearing loss, mouth sores, nosebleeds, sneezing, sore throat, tinnitus and trouble swallowing.    Eyes:  Negative for photophobia, pain, discharge, redness, itching and visual disturbance.   Respiratory:  Negative for cough, chest tightness, shortness of breath and wheezing.    Cardiovascular:  Negative for chest pain, palpitations and leg swelling.   Gastrointestinal:  Negative for abdominal distention, abdominal pain, blood in stool, constipation, diarrhea, nausea, vomiting and reflux.   Endocrine: Negative for cold intolerance, heat intolerance, polydipsia, polyphagia and polyuria.   Genitourinary:  Negative for difficulty urinating, dyspareunia, dysuria, flank pain, frequency, genital sores, hematuria, menstrual problem, pelvic pain, urgency, vaginal bleeding, vaginal discharge, vaginal pain and vaginal dryness.   Musculoskeletal:  Negative for arthralgias, back pain, gait problem, joint swelling, leg pain, myalgias, neck pain, neck stiffness and joint deformity.   Integumentary:  Negative for pallor and rash.   Neurological:  Negative for dizziness, seizures, weakness, light-headedness, numbness and headaches.   Hematological:  Negative for adenopathy. Does not bruise/bleed easily.   Psychiatric/Behavioral:  Negative for  confusion, decreased concentration and sleep disturbance. The patient is not nervous/anxious.    All other systems reviewed and are negative.       Chronic comorbid conditions affecting medical decision making today:  History reviewed. No pertinent past medical history.  Past Surgical History:   Procedure Laterality Date    HYSTERECTOMY       Family History   Problem Relation Age of Onset    Hypertension Mother     Stroke Mother     Diabetes Mother     Coronary artery disease Mother     Heart attack Mother     Hypertension Father     Hyperlipidemia Father     Cancer Father         lung cancer     Social History     Substance and Sexual Activity   Alcohol Use Yes    Comment: holidays     Social History     Tobacco Use   Smoking Status Never   Smokeless Tobacco Never     Social History     Substance and Sexual Activity   Drug Use Not Currently       Current Outpatient Medications:     furosemide (LASIX) 20 MG tablet, , Disp: , Rfl:     gabapentin (NEURONTIN) 300 MG capsule, Take by mouth., Disp: , Rfl:     mycophenolate (CELLCEPT) 500 mg Tab, , Disp: , Rfl:     NIFEdipine (ADALAT CC) 60 MG TbSR, , Disp: , Rfl:     nitroGLYCERIN 2% TD oint (NITRO-BID) 2 % ointment, Apply 0.5 inches topically 3 (three) times daily., Disp: 45 inch, Rfl: 11    omeprazole (PRILOSEC) 40 MG capsule, , Disp: , Rfl:     potassium chloride (KLOR-CON) 10 MEQ TbSR, , Disp: , Rfl:     sildenafil (REVATIO) 20 mg Tab, , Disp: , Rfl:     sucralfate (CARAFATE) 100 mg/mL suspension, , Disp: , Rfl:     sulfamethoxazole-trimethoprim 800-160mg (BACTRIM DS) 800-160 mg Tab, Take 1 tablet by mouth 3 (three) times a week., Disp: , Rfl:     triamcinolone acetonide 0.1% (KENALOG) 0.1 % cream, Apply topically 2 (two) times daily. x 1-2 wks then prn flares only, Disp: 454 g, Rfl: 1    celecoxib (CELEBREX) 100 MG capsule, Take 1 capsule (100 mg total) by mouth 2 (two) times daily., Disp: 60 capsule, Rfl: 6    cyclobenzaprine (FLEXERIL) 5 MG tablet, Take 1 tablet  (5 mg total) by mouth 3 (three) times daily as needed for Muscle spasms., Disp: 60 tablet, Rfl: 6    DULoxetine (CYMBALTA) 30 MG capsule, Take 1 capsule (30 mg total) by mouth once daily. (Patient not taking: Reported on 6/8/2023), Disp: 30 capsule, Rfl: 11    ergocalciferol (ERGOCALCIFEROL) 50,000 unit Cap, Take 1 capsule (50,000 Units total) by mouth every 7 days. (Patient not taking: Reported on 6/26/2023), Disp: 12 capsule, Rfl: 0    erythromycin 250 mg EC capsule, , Disp: , Rfl:     estradioL (ESTRACE) 0.01 % (0.1 mg/gram) vaginal cream, , Disp: , Rfl:     fluticasone (FLONASE) 50 mcg/actuation nasal spray, 2 sprays by Each Nare route once daily. (Patient not taking: Reported on 10/11/2023), Disp: 16 g, Rfl: 2    metroNIDAZOLE (FLAGYL) 500 MG tablet, Take 500 mg by mouth 2 (two) times daily., Disp: , Rfl:     mirtazapine (REMERON) 30 MG tablet, , Disp: , Rfl:     nystatin (MYCOSTATIN) ointment, , Disp: , Rfl:     pantoprazole (PROTONIX) 40 MG tablet, , Disp: , Rfl:     pregabalin (LYRICA) 200 MG Cap, Take 1 capsule (200 mg total) by mouth 2 (two) times daily., Disp: 60 capsule, Rfl: 5    RESTASIS 0.05 % ophthalmic emulsion, , Disp: , Rfl:      Objective:         Vitals:    10/11/23 1014   BP: 130/64   Pulse: 64     Physical Exam  Can make fist, no synovitis   Salt and pepper skin on shins   +tender points     Reviewed old and all outside pertinent medical records available.    All lab results personally reviewed and interpreted by me.  Lab Results   Component Value Date    WBC 4.9 10/03/2023    HGB 11.0 (L) 10/03/2023    HCT 34.4 (L) 10/03/2023    MCV 76.6 (L) 10/03/2023    MCH 24.4 (L) 10/03/2023    MCHC 31.9 10/03/2023    RDW 15.0 (H) 10/03/2023     11/27/2020    MPV 8.6 10/03/2023       Lab Results   Component Value Date     03/02/2023    K 3.3 (L) 03/02/2023     11/27/2020    CO2 24 03/02/2023     11/27/2020    BUN 13.0 03/02/2023    CALCIUM 9.1 03/02/2023    PROT 8.5 (H)  "11/27/2020    ALBUMIN 3.9 03/02/2023    BILITOT 0.3 03/02/2023    AST 15 03/02/2023    ALKPHOS 167 (H) 11/27/2020    ALT 7 03/02/2023       Lab Results   Component Value Date    COLORU Yellow 01/03/2017    APPEARANCEUA Hazy (A) 01/03/2017    SPECGRAV 1.015 01/03/2017    PHUR 5.0 01/03/2017    PROTEINUA Negative 01/03/2017    KETONESU Negative 01/03/2017    LEUKOCYTESUR Negative 01/03/2017    NITRITE Negative 01/03/2017    UROBILINOGEN Negative 01/03/2017       Lab Results   Component Value Date    CRP 6.5 11/27/2020       Lab Results   Component Value Date    SEDRATE 57 (H) 11/27/2020       Lab Results   Component Value Date    SEDRATE 57 (H) 11/27/2020       No components found for: "25OHVITDTOT", "50BWWZAX7", "09DTHIOZ7", "METHODNOTE"    No results found for: "URICACID"    No components found for: "TSPOTTB"      Imaging:  All imaging reviewed and independently interpreted by me.         ASSESSMENT / PLAN:     Megan Brooks is a 52 y.o. Black or  female with:      1. Mixed Connective Tissue Disease  - Patient with features of Sjogren's and Scleroderma, she has sicca, sclerodactyly, ILD, GERD, Skin tightening, oral sores, alopecia, Raynaud's   - she is currently under the care of both Pulmonary and GI at [facility]   - she is under immunosuppressive therapy: RTX (last infusion 5/30/23, next 12/13/23), MMF  - I agree with current management and will leave the choice of IS therapy to them   - CCB+Sildenafil, Nitro Paste PRN, warmth precautions   - recent labs reviewed   - reassurance     2. Fibromyalgia  - stable  - continue Lyrica, Flexeril, Cymbalta, NSAIDs   - sleep hygiene and stress management reinforced  - reassurance and exercise     3. Osteoarthritis, unspecified osteoarthritis type, unspecified site  - chronic  - wt loss   - continue NSAIDs   - Reassurance and exercise     4. Chronic NSAID use  - no history of GI bleed or coronary artery disease.  - previous labs without features of CKD.  - clinical " significance side effect of long-term NSAID use discussed in detail.  - recommended for alternative therapies for pain management.    5. DMARD Toxicity Screening  - no live vaccines, and vaccines must be scheduled around RTX dose   - yearly EYE exams  - labs q3 months   - Bactrim MWF   - immunosuppression/infectious precautions discussed     6. Other specified counseling  - over 10 minutes spent regarding below topics:  - Immunization counseling done.  - Weight loss counseling done.  - Nutrition and exercise counseling.  - Limitation of alcohol consumption.  - Regular exercise:  Aerobic and resistance.  - Medication counseling provided.    7. Obesity  - would benefit from decreasing at least 10% of body weight.  - recommended goal of losing 1 lb per week.  - consider nutritionist evaluation.  - would consider screening for MAIKEL per PMD.    No follow-ups on file.    Method of contact with patient concerns: [facility]    Disclaimer:  This note is prepared using voice recognition software and as such is likely to have errors and has not been proof read. Please contact me for questions.     Time spent: 30 minutes in face to face discussion concerning diagnosis, prognosis, review of lab and test results, benefits of treatment as well as management of disease, counseling of patient and coordination of care between various health care providers.  Greater than half the time spent was used for coordination of care and counseling of patient.    Herrera Gonsales M.D.  Rheumatology Department   [facility]

## 2024-01-04 ENCOUNTER — TELEPHONE (OUTPATIENT)
Dept: RHEUMATOLOGY | Facility: CLINIC | Age: 54
End: 2024-01-04
Payer: MEDICARE

## 2024-01-04 NOTE — TELEPHONE ENCOUNTER
----- Message from Sharonda Scherer MA sent at 1/4/2024  2:43 PM CST -----  Regarding: FW: establish care  Contact: [phone]    ----- Message -----  From: Cristel Garza  Sent: 1/4/2024   1:05 PM CST  To: [facility] Staff  Subject: establish care                                   Patient is requesting a call back regarding establishing care.   Would the patient rather a call back or a response via [facility]?  Call   Best Call Back Number:   [phone]  Additional Information:

## 2024-01-04 NOTE — TELEPHONE ENCOUNTER
I advise patient to call in 3 weeks to make an appointment with Dr. Plascencia. Patient voices understanding.